# Patient Record
Sex: FEMALE | Race: WHITE | NOT HISPANIC OR LATINO | Employment: OTHER | URBAN - METROPOLITAN AREA
[De-identification: names, ages, dates, MRNs, and addresses within clinical notes are randomized per-mention and may not be internally consistent; named-entity substitution may affect disease eponyms.]

---

## 2023-05-15 ENCOUNTER — OFFICE VISIT (OUTPATIENT)
Dept: PRIMARY CARE | Facility: CLINIC | Age: 88
End: 2023-05-15
Payer: MEDICARE

## 2023-05-15 VITALS
DIASTOLIC BLOOD PRESSURE: 86 MMHG | OXYGEN SATURATION: 100 % | HEART RATE: 90 BPM | BODY MASS INDEX: 25.57 KG/M2 | WEIGHT: 158.4 LBS | SYSTOLIC BLOOD PRESSURE: 136 MMHG

## 2023-05-15 DIAGNOSIS — Z13.0 SCREENING FOR DEFICIENCY ANEMIA: ICD-10-CM

## 2023-05-15 DIAGNOSIS — R29.898 LEG WEAKNESS, BILATERAL: ICD-10-CM

## 2023-05-15 DIAGNOSIS — Z13.29 SCREENING FOR THYROID DISORDER: ICD-10-CM

## 2023-05-15 DIAGNOSIS — E55.9 VITAMIN D DEFICIENCY: ICD-10-CM

## 2023-05-15 DIAGNOSIS — I10 PRIMARY HYPERTENSION: ICD-10-CM

## 2023-05-15 DIAGNOSIS — Z00.00 ROUTINE GENERAL MEDICAL EXAMINATION AT A HEALTH CARE FACILITY: Primary | ICD-10-CM

## 2023-05-15 DIAGNOSIS — E06.3 HYPOTHYROIDISM DUE TO HASHIMOTO'S THYROIDITIS: ICD-10-CM

## 2023-05-15 DIAGNOSIS — E03.8 HYPOTHYROIDISM DUE TO HASHIMOTO'S THYROIDITIS: ICD-10-CM

## 2023-05-15 DIAGNOSIS — Z13.6 ENCOUNTER FOR SCREENING FOR CARDIOVASCULAR DISORDERS: ICD-10-CM

## 2023-05-15 PROBLEM — M79.651 PAIN OF RIGHT THIGH: Status: ACTIVE | Noted: 2023-05-15

## 2023-05-15 PROBLEM — M54.50 LOW BACK PAIN: Status: ACTIVE | Noted: 2023-05-15

## 2023-05-15 PROBLEM — F43.0 ACUTE STRESS DISORDER: Status: ACTIVE | Noted: 2023-05-15

## 2023-05-15 PROBLEM — M19.90 OSTEOARTHRITIS: Status: ACTIVE | Noted: 2023-05-15

## 2023-05-15 PROBLEM — Z85.828 HISTORY OF MOHS MICROGRAPHIC SURGERY FOR SKIN CANCER: Status: ACTIVE | Noted: 2023-05-15

## 2023-05-15 PROBLEM — L71.9 ROSACEA: Status: ACTIVE | Noted: 2023-05-15

## 2023-05-15 PROBLEM — M19.072 OSTEOARTHRITIS OF LEFT ANKLE: Status: ACTIVE | Noted: 2023-05-15

## 2023-05-15 PROBLEM — M19.049 OA (OSTEOARTHRITIS) OF FINGER: Status: ACTIVE | Noted: 2023-05-15

## 2023-05-15 PROBLEM — R00.2 PALPITATIONS: Status: ACTIVE | Noted: 2023-05-15

## 2023-05-15 PROBLEM — R26.81 UNSTEADINESS: Status: ACTIVE | Noted: 2023-05-15

## 2023-05-15 PROBLEM — E78.5 HYPERLIPIDEMIA: Status: ACTIVE | Noted: 2023-05-15

## 2023-05-15 PROBLEM — R60.0 EDEMA OF BOTH LEGS: Status: ACTIVE | Noted: 2023-05-15

## 2023-05-15 PROBLEM — G56.03 BILATERAL CARPAL TUNNEL SYNDROME: Status: ACTIVE | Noted: 2023-05-15

## 2023-05-15 PROBLEM — R06.09 DYSPNEA ON EXERTION: Status: ACTIVE | Noted: 2023-05-15

## 2023-05-15 PROBLEM — Z98.890 HISTORY OF MOHS MICROGRAPHIC SURGERY FOR SKIN CANCER: Status: ACTIVE | Noted: 2023-05-15

## 2023-05-15 PROBLEM — E03.9 HYPOTHYROIDISM: Status: ACTIVE | Noted: 2023-05-15

## 2023-05-15 PROBLEM — R94.31 EKG, ABNORMAL: Status: ACTIVE | Noted: 2023-05-15

## 2023-05-15 PROBLEM — M81.0 OSTEOPOROSIS: Status: ACTIVE | Noted: 2023-05-15

## 2023-05-15 LAB
ALANINE AMINOTRANSFERASE (SGPT) (U/L) IN SER/PLAS: 12 U/L (ref 7–45)
ALBUMIN (G/DL) IN SER/PLAS: 4.1 G/DL (ref 3.4–5)
ALKALINE PHOSPHATASE (U/L) IN SER/PLAS: 76 U/L (ref 33–136)
ANION GAP IN SER/PLAS: 15 MMOL/L (ref 10–20)
ASPARTATE AMINOTRANSFERASE (SGOT) (U/L) IN SER/PLAS: 17 U/L (ref 9–39)
BILIRUBIN TOTAL (MG/DL) IN SER/PLAS: 0.7 MG/DL (ref 0–1.2)
CALCIDIOL (25 OH VITAMIN D3) (NG/ML) IN SER/PLAS: 58 NG/ML
CALCIUM (MG/DL) IN SER/PLAS: 9.1 MG/DL (ref 8.6–10.6)
CARBON DIOXIDE, TOTAL (MMOL/L) IN SER/PLAS: 28 MMOL/L (ref 21–32)
CHLORIDE (MMOL/L) IN SER/PLAS: 102 MMOL/L (ref 98–107)
CHOLESTEROL (MG/DL) IN SER/PLAS: 176 MG/DL (ref 0–199)
CHOLESTEROL IN HDL (MG/DL) IN SER/PLAS: 56.2 MG/DL
CHOLESTEROL/HDL RATIO: 3.1
COBALAMIN (VITAMIN B12) (PG/ML) IN SER/PLAS: >2000 PG/ML (ref 211–911)
CREATININE (MG/DL) IN SER/PLAS: 1.3 MG/DL (ref 0.5–1.05)
ERYTHROCYTE DISTRIBUTION WIDTH (RATIO) BY AUTOMATED COUNT: 13.8 % (ref 11.5–14.5)
ERYTHROCYTE MEAN CORPUSCULAR HEMOGLOBIN CONCENTRATION (G/DL) BY AUTOMATED: 31.5 G/DL (ref 32–36)
ERYTHROCYTE MEAN CORPUSCULAR VOLUME (FL) BY AUTOMATED COUNT: 92 FL (ref 80–100)
ERYTHROCYTES (10*6/UL) IN BLOOD BY AUTOMATED COUNT: 4.22 X10E12/L (ref 4–5.2)
GFR FEMALE: 39 ML/MIN/1.73M2
GLUCOSE (MG/DL) IN SER/PLAS: 96 MG/DL (ref 74–99)
HEMATOCRIT (%) IN BLOOD BY AUTOMATED COUNT: 39 % (ref 36–46)
HEMOGLOBIN (G/DL) IN BLOOD: 12.3 G/DL (ref 12–16)
LDL: 96 MG/DL (ref 0–99)
LEUKOCYTES (10*3/UL) IN BLOOD BY AUTOMATED COUNT: 5.5 X10E9/L (ref 4.4–11.3)
NRBC (PER 100 WBCS) BY AUTOMATED COUNT: 0 /100 WBC (ref 0–0)
PLATELETS (10*3/UL) IN BLOOD AUTOMATED COUNT: 290 X10E9/L (ref 150–450)
POTASSIUM (MMOL/L) IN SER/PLAS: 4.1 MMOL/L (ref 3.5–5.3)
PROTEIN TOTAL: 6.9 G/DL (ref 6.4–8.2)
SODIUM (MMOL/L) IN SER/PLAS: 141 MMOL/L (ref 136–145)
THYROTROPIN (MIU/L) IN SER/PLAS BY DETECTION LIMIT <= 0.05 MIU/L: 1.73 MIU/L (ref 0.44–3.98)
TRIGLYCERIDE (MG/DL) IN SER/PLAS: 119 MG/DL (ref 0–149)
UREA NITROGEN (MG/DL) IN SER/PLAS: 30 MG/DL (ref 6–23)
VLDL: 24 MG/DL (ref 0–40)

## 2023-05-15 PROCEDURE — 3075F SYST BP GE 130 - 139MM HG: CPT | Performed by: STUDENT IN AN ORGANIZED HEALTH CARE EDUCATION/TRAINING PROGRAM

## 2023-05-15 PROCEDURE — G0439 PPPS, SUBSEQ VISIT: HCPCS | Performed by: STUDENT IN AN ORGANIZED HEALTH CARE EDUCATION/TRAINING PROGRAM

## 2023-05-15 PROCEDURE — 85027 COMPLETE CBC AUTOMATED: CPT

## 2023-05-15 PROCEDURE — 80053 COMPREHEN METABOLIC PANEL: CPT

## 2023-05-15 PROCEDURE — 1036F TOBACCO NON-USER: CPT | Performed by: STUDENT IN AN ORGANIZED HEALTH CARE EDUCATION/TRAINING PROGRAM

## 2023-05-15 PROCEDURE — 1160F RVW MEDS BY RX/DR IN RCRD: CPT | Performed by: STUDENT IN AN ORGANIZED HEALTH CARE EDUCATION/TRAINING PROGRAM

## 2023-05-15 PROCEDURE — 1170F FXNL STATUS ASSESSED: CPT | Performed by: STUDENT IN AN ORGANIZED HEALTH CARE EDUCATION/TRAINING PROGRAM

## 2023-05-15 PROCEDURE — 1159F MED LIST DOCD IN RCRD: CPT | Performed by: STUDENT IN AN ORGANIZED HEALTH CARE EDUCATION/TRAINING PROGRAM

## 2023-05-15 PROCEDURE — 84443 ASSAY THYROID STIM HORMONE: CPT

## 2023-05-15 PROCEDURE — 82306 VITAMIN D 25 HYDROXY: CPT

## 2023-05-15 PROCEDURE — 80061 LIPID PANEL: CPT

## 2023-05-15 PROCEDURE — 3079F DIAST BP 80-89 MM HG: CPT | Performed by: STUDENT IN AN ORGANIZED HEALTH CARE EDUCATION/TRAINING PROGRAM

## 2023-05-15 PROCEDURE — 82607 VITAMIN B-12: CPT

## 2023-05-15 RX ORDER — ASPIRIN 81 MG/1
1 TABLET ORAL DAILY
COMMUNITY

## 2023-05-15 RX ORDER — LEVOTHYROXINE SODIUM 50 UG/1
1 TABLET ORAL DAILY
COMMUNITY
Start: 2020-11-10 | End: 2023-12-26

## 2023-05-15 RX ORDER — ACETAMINOPHEN, DIPHENHYDRAMINE HCL, PHENYLEPHRINE HCL 325; 25; 5 MG/1; MG/1; MG/1
TABLET ORAL
COMMUNITY
Start: 2021-11-09 | End: 2023-11-15 | Stop reason: ALTCHOICE

## 2023-05-15 RX ORDER — TORSEMIDE 10 MG/1
TABLET ORAL
COMMUNITY
End: 2023-10-27

## 2023-05-15 RX ORDER — CHLORHEXIDINE GLUCONATE ORAL RINSE 1.2 MG/ML
SOLUTION DENTAL
COMMUNITY
Start: 2022-09-30 | End: 2023-11-15 | Stop reason: ALTCHOICE

## 2023-05-15 RX ORDER — PRAVASTATIN SODIUM 40 MG/1
40 TABLET ORAL DAILY
COMMUNITY
End: 2023-10-27

## 2023-05-15 RX ORDER — MUPIROCIN 20 MG/G
OINTMENT TOPICAL
COMMUNITY
Start: 2022-11-30 | End: 2023-11-15 | Stop reason: ALTCHOICE

## 2023-05-15 RX ORDER — AMLODIPINE BESYLATE 5 MG/1
5 TABLET ORAL DAILY
COMMUNITY
End: 2023-10-27

## 2023-05-15 RX ORDER — AMOXICILLIN 500 MG/1
CAPSULE ORAL
COMMUNITY
Start: 2023-03-14

## 2023-05-15 RX ORDER — IVERMECTIN 10 MG/G
CREAM TOPICAL
COMMUNITY
Start: 2018-11-01

## 2023-05-15 RX ORDER — PENICILLIN V POTASSIUM 500 MG/1
TABLET, FILM COATED ORAL
COMMUNITY
Start: 2022-09-30 | End: 2023-11-15 | Stop reason: ALTCHOICE

## 2023-05-15 RX ORDER — CHOLECALCIFEROL (VITAMIN D3) 125 MCG
TABLET ORAL
COMMUNITY
Start: 2019-12-06

## 2023-05-15 RX ORDER — POTASSIUM CHLORIDE 750 MG/1
10 TABLET, EXTENDED RELEASE ORAL DAILY
COMMUNITY
End: 2023-10-27

## 2023-05-15 ASSESSMENT — ENCOUNTER SYMPTOMS
OCCASIONAL FEELINGS OF UNSTEADINESS: 0
LOSS OF SENSATION IN FEET: 0
DEPRESSION: 0

## 2023-05-15 ASSESSMENT — ACTIVITIES OF DAILY LIVING (ADL)
BATHING: INDEPENDENT
TAKING_MEDICATION: INDEPENDENT
DOING_HOUSEWORK: INDEPENDENT
MANAGING_FINANCES: INDEPENDENT
DRESSING: INDEPENDENT
GROCERY_SHOPPING: INDEPENDENT

## 2023-05-15 ASSESSMENT — PATIENT HEALTH QUESTIONNAIRE - PHQ9
1. LITTLE INTEREST OR PLEASURE IN DOING THINGS: NOT AT ALL
2. FEELING DOWN, DEPRESSED OR HOPELESS: NOT AT ALL
SUM OF ALL RESPONSES TO PHQ9 QUESTIONS 1 AND 2: 0

## 2023-05-15 NOTE — PATIENT INSTRUCTIONS
1.  Medicare wellness.  No concerns on exam.  Screening labs ordered today.  Up-to-date with vaccinations.    2.  Bilateral leg weakness, she has not had falls but she does need a cane.  Advised on physical therapy for strengthening program for her lower extremities.  Also disability placard written today.    3.  History of hypothyroidism.  Hypertension.  Repeat labs today continue current med.    Call for any refills needed.    Pedro Luis Berrios DO  for questions and f/u please call office   [place] [phone] [facility] [phone]  any forms needed please fax   [place] [phone] [facility] [phone]  for Physical therapy orders can call [phone]  for Radiology orders can call [phone]  for general referrals can call [phone]  for cardiac testing can call [phone]

## 2023-05-15 NOTE — PROGRESS NOTES
Subjective   Patient ID: Jami Pandey is a 89 y.o. female who presents for Medicare Annual Wellness Visit Subsequent (She is fasting.).    HPI comes in for Medicare wellness.    Review of Systems  Constitutional: NO F, chills, or sweats  Eyes: no blurred vision or visual disturbance  ENT: no hearing loss, no congestion, no nasal discharge, no hoarseness and no sore throat.   Cardiovascular: no chest pain, no edema, no palps and no syncope.   Respiratory: no cough,no s.o.b. and no wheezing  Gastrointestinal: no abdominal pain, No C/D no N/V, no blood in stools  Genitourinary: no dysuria, no change in urinary frequency, no urinary hesitancy and no feelings of urinary urgency.   Musculoskeletal: no arthralgias,  no back pain and no myalgias.   Integumentary: no new skin lesions and no rashes.   Neurological: no difficulty walking, no headache, no limb weakness, no numbness and no tingling.   Psychiatric: no anxiety, no depression, no anhedonia and no substance use disorders.   Endocrine: no recent weight gain and no recent weight loss.   Hematologic/Lymphatic: no tendency for easy bruising and no swollen glands.  Objective   /86 (BP Location: Right arm, Patient Position: Sitting, BP Cuff Size: Adult)   Pulse 90   Wt 71.8 kg (158 lb 6.4 oz)   SpO2 100%   BMI 25.57 kg/m²     Physical Exam  gen- a & o x 3, nad, pleasant  heent- eomi, perrla, ear canals patent, TM's non-erythematous, no fluid, frontal and maxillary sinus's nontender  neck- supple, nontender, no palpable or enlarged nodes, no thyromegaly  heart- rrr, no murmurs  lungs- cta b/l , no w/r/r  chest- symmetric, nontender  ab- soft, nontender, no palpable organomegaly, postive bowel sounds  ex's- no c/c/e  neuro- CNs 2-12 grossly intact, full sensation and strength in all extremities    Assessment/Plan     1.  Medicare wellness.  No concerns on exam.  Screening labs ordered today.  Up-to-date with vaccinations.    2.  Bilateral leg weakness, she has  not had falls but she does need a cane.  Advised on physical therapy for strengthening program for her lower extremities.  Also disability placard written today.    3.  History of hypothyroidism.  Hypertension.  Repeat labs today continue current med.    Call for any refills needed.

## 2023-10-27 DIAGNOSIS — E78.5 HYPERLIPIDEMIA, UNSPECIFIED HYPERLIPIDEMIA TYPE: ICD-10-CM

## 2023-10-27 DIAGNOSIS — I10 PRIMARY HYPERTENSION: Primary | ICD-10-CM

## 2023-10-27 PROBLEM — Z98.890 HISTORY OF MOHS MICROGRAPHIC SURGERY FOR SKIN CANCER: Status: ACTIVE | Noted: 2019-12-07

## 2023-10-27 PROBLEM — Z85.828 HISTORY OF MOHS MICROGRAPHIC SURGERY FOR SKIN CANCER: Status: ACTIVE | Noted: 2019-12-07

## 2023-10-27 PROBLEM — S01.00XA OPEN WOUND OF SCALP, COMPLICATED: Status: ACTIVE | Noted: 2019-12-07

## 2023-10-27 RX ORDER — FUROSEMIDE 20 MG/1
1 TABLET ORAL DAILY
COMMUNITY
Start: 2017-06-14 | End: 2023-11-15 | Stop reason: ALTCHOICE

## 2023-10-27 RX ORDER — TORSEMIDE 10 MG/1
TABLET ORAL
Qty: 135 TABLET | Refills: 3 | Status: SHIPPED | OUTPATIENT
Start: 2023-10-27

## 2023-10-27 RX ORDER — AMLODIPINE BESYLATE 5 MG/1
5 TABLET ORAL DAILY
Qty: 90 TABLET | Refills: 3 | Status: SHIPPED | OUTPATIENT
Start: 2023-10-27

## 2023-10-27 RX ORDER — POTASSIUM CHLORIDE 750 MG/1
TABLET, FILM COATED, EXTENDED RELEASE ORAL DAILY
Qty: 90 TABLET | Refills: 3 | Status: SHIPPED | OUTPATIENT
Start: 2023-10-27

## 2023-10-27 RX ORDER — PRAVASTATIN SODIUM 40 MG/1
40 TABLET ORAL DAILY
Qty: 90 TABLET | Refills: 3 | Status: SHIPPED | OUTPATIENT
Start: 2023-10-27

## 2023-11-15 ENCOUNTER — OFFICE VISIT (OUTPATIENT)
Dept: PRIMARY CARE | Facility: CLINIC | Age: 88
End: 2023-11-15
Payer: MEDICARE

## 2023-11-15 VITALS
WEIGHT: 157.8 LBS | HEIGHT: 66 IN | OXYGEN SATURATION: 99 % | SYSTOLIC BLOOD PRESSURE: 128 MMHG | BODY MASS INDEX: 25.36 KG/M2 | DIASTOLIC BLOOD PRESSURE: 62 MMHG | HEART RATE: 85 BPM

## 2023-11-15 DIAGNOSIS — L91.8 SKIN TAG: Primary | ICD-10-CM

## 2023-11-15 PROCEDURE — 1126F AMNT PAIN NOTED NONE PRSNT: CPT | Performed by: STUDENT IN AN ORGANIZED HEALTH CARE EDUCATION/TRAINING PROGRAM

## 2023-11-15 PROCEDURE — 3078F DIAST BP <80 MM HG: CPT | Performed by: STUDENT IN AN ORGANIZED HEALTH CARE EDUCATION/TRAINING PROGRAM

## 2023-11-15 PROCEDURE — 1160F RVW MEDS BY RX/DR IN RCRD: CPT | Performed by: STUDENT IN AN ORGANIZED HEALTH CARE EDUCATION/TRAINING PROGRAM

## 2023-11-15 PROCEDURE — 1159F MED LIST DOCD IN RCRD: CPT | Performed by: STUDENT IN AN ORGANIZED HEALTH CARE EDUCATION/TRAINING PROGRAM

## 2023-11-15 PROCEDURE — 99213 OFFICE O/P EST LOW 20 MIN: CPT | Performed by: STUDENT IN AN ORGANIZED HEALTH CARE EDUCATION/TRAINING PROGRAM

## 2023-11-15 PROCEDURE — 3074F SYST BP LT 130 MM HG: CPT | Performed by: STUDENT IN AN ORGANIZED HEALTH CARE EDUCATION/TRAINING PROGRAM

## 2023-11-15 PROCEDURE — 1036F TOBACCO NON-USER: CPT | Performed by: STUDENT IN AN ORGANIZED HEALTH CARE EDUCATION/TRAINING PROGRAM

## 2023-11-15 RX ORDER — CEPHALEXIN 500 MG/1
500 CAPSULE ORAL 3 TIMES DAILY
Qty: 30 CAPSULE | Refills: 0 | Status: SHIPPED | OUTPATIENT
Start: 2023-11-15 | End: 2023-11-25

## 2023-11-15 ASSESSMENT — PAIN SCALES - GENERAL: PAINLEVEL: 0-NO PAIN

## 2023-11-15 ASSESSMENT — ENCOUNTER SYMPTOMS: DEPRESSION: 0

## 2023-11-15 NOTE — PROGRESS NOTES
"Subjective   Patient ID: Jami Pandey is a 89 y.o. female who presents for Follow-up (6 month follow up for diabetes.).    HPI comes in for 6-month checkup    Review of Systems  Constitutional: NO F, chills, or sweats  Eyes: no blurred vision or visual disturbance  ENT: no hearing loss, no congestion, no nasal discharge, no hoarseness and no sore throat.   Cardiovascular: no chest pain, no edema, no palps and no syncope.   Respiratory: no cough,no s.o.b. and no wheezing  Gastrointestinal: no abdominal pain, No C/D no N/V, no blood in stools  Genitourinary: no dysuria, no change in urinary frequency, no urinary hesitancy and no feelings of urinary urgency.   Musculoskeletal: no arthralgias,  no back pain and no myalgias.   Integumentary: Sore posterior left thigh after skin tag removal  Neurological: no difficulty walking, no headache, no limb weakness, no numbness and no tingling.   Psychiatric: no anxiety, no depression, no anhedonia and no substance use disorders.   Endocrine: no recent weight gain and no recent weight loss.   Hematologic/Lymphatic: no tendency for easy bruising and no swollen glands.  Objective   /62 (BP Location: Right arm, Patient Position: Sitting, BP Cuff Size: Adult)   Pulse 85   Ht 1.676 m (5' 6\")   Wt 71.6 kg (157 lb 12.8 oz)   SpO2 99%   BMI 25.47 kg/m²     Physical Exam  gen- a & o x 3, nad, pleasant  heent- eomi, perrla, ear canals patent, TM's non-erythematous, no fluid, frontal and maxillary sinus's nontender  neck- supple, nontender, no palpable or enlarged nodes, no thyromegaly  heart- rrr, no murmurs  lungs- cta b/l , no w/r/r  chest- symmetric, nontender  ab- soft, nontender, no palpable organomegaly, postive bowel sounds  ex's- no c/c/e  neuro- CNs 2-12 grossly intact, full sensation and strength in all extremities  skin-left posterior thigh open sore mild drainage  Assessment/Plan     1.  Comes in for 6-month checkup.  No concerns on exam.  No major changes.  She is " up-to-date with vaccinations up-to-date with health screenings.  Follow-up in 6 months for Medicare wellness.    2.  Recently had a skin tag removed left posterior thigh.  Some mild erythema concern for early infection.  Keflex as directed for 10 days

## 2023-11-15 NOTE — PATIENT INSTRUCTIONS
1.  Comes in for 6-month checkup.  No concerns on exam.  No major changes.  She is up-to-date with vaccinations up-to-date with health screenings.  Follow-up in 6 months for Medicare wellness.    2.  Recently had a skin tag removed left posterior thigh.  Some mild erythema concern for early infection.  Keflex as directed for 10 days

## 2023-12-26 DIAGNOSIS — E03.9 HYPOTHYROIDISM, UNSPECIFIED TYPE: Primary | ICD-10-CM

## 2023-12-26 RX ORDER — LEVOTHYROXINE SODIUM 50 UG/1
50 TABLET ORAL DAILY
Qty: 90 TABLET | Refills: 3 | Status: SHIPPED | OUTPATIENT
Start: 2023-12-26

## 2024-05-20 ENCOUNTER — OFFICE VISIT (OUTPATIENT)
Dept: PRIMARY CARE | Facility: CLINIC | Age: 89
End: 2024-05-20
Payer: MEDICARE

## 2024-05-20 VITALS
WEIGHT: 153.8 LBS | HEART RATE: 70 BPM | DIASTOLIC BLOOD PRESSURE: 72 MMHG | BODY MASS INDEX: 24.82 KG/M2 | SYSTOLIC BLOOD PRESSURE: 132 MMHG | OXYGEN SATURATION: 96 %

## 2024-05-20 DIAGNOSIS — E06.3 HYPOTHYROIDISM DUE TO HASHIMOTO'S THYROIDITIS: ICD-10-CM

## 2024-05-20 DIAGNOSIS — I10 PRIMARY HYPERTENSION: ICD-10-CM

## 2024-05-20 DIAGNOSIS — Z13.6 ENCOUNTER FOR SCREENING FOR CARDIOVASCULAR DISORDERS: ICD-10-CM

## 2024-05-20 DIAGNOSIS — E55.9 VITAMIN D DEFICIENCY: ICD-10-CM

## 2024-05-20 DIAGNOSIS — Z13.29 SCREENING FOR THYROID DISORDER: ICD-10-CM

## 2024-05-20 DIAGNOSIS — Z00.00 WELLNESS EXAMINATION: Primary | ICD-10-CM

## 2024-05-20 DIAGNOSIS — E03.8 HYPOTHYROIDISM DUE TO HASHIMOTO'S THYROIDITIS: ICD-10-CM

## 2024-05-20 DIAGNOSIS — Z13.0 SCREENING FOR DEFICIENCY ANEMIA: ICD-10-CM

## 2024-05-20 DIAGNOSIS — E78.5 HYPERLIPIDEMIA, UNSPECIFIED HYPERLIPIDEMIA TYPE: ICD-10-CM

## 2024-05-20 PROBLEM — G56.00 CARPAL TUNNEL SYNDROME: Status: ACTIVE | Noted: 2024-05-20

## 2024-05-20 PROBLEM — M79.659 PAIN OF THIGH: Status: ACTIVE | Noted: 2023-05-15

## 2024-05-20 PROBLEM — R60.0 EDEMA OF LOWER EXTREMITY: Status: ACTIVE | Noted: 2024-05-20

## 2024-05-20 PROBLEM — E66.3 OVERWEIGHT WITH BODY MASS INDEX (BMI) 25.0-29.9: Status: ACTIVE | Noted: 2024-05-20

## 2024-05-20 LAB
25(OH)D3 SERPL-MCNC: 68 NG/ML (ref 30–100)
ALBUMIN SERPL BCP-MCNC: 4.2 G/DL (ref 3.4–5)
ALP SERPL-CCNC: 74 U/L (ref 33–136)
ALT SERPL W P-5'-P-CCNC: 11 U/L (ref 7–45)
ANION GAP SERPL CALC-SCNC: 17 MMOL/L (ref 10–20)
AST SERPL W P-5'-P-CCNC: 15 U/L (ref 9–39)
BASOPHILS # BLD AUTO: 0.07 X10*3/UL (ref 0–0.1)
BASOPHILS NFR BLD AUTO: 1.1 %
BILIRUB SERPL-MCNC: 0.6 MG/DL (ref 0–1.2)
BUN SERPL-MCNC: 29 MG/DL (ref 6–23)
CALCIUM SERPL-MCNC: 9.2 MG/DL (ref 8.6–10.6)
CHLORIDE SERPL-SCNC: 101 MMOL/L (ref 98–107)
CHOLEST SERPL-MCNC: 155 MG/DL (ref 0–199)
CHOLESTEROL/HDL RATIO: 3.1
CO2 SERPL-SCNC: 29 MMOL/L (ref 21–32)
CREAT SERPL-MCNC: 1.39 MG/DL (ref 0.5–1.05)
EGFRCR SERPLBLD CKD-EPI 2021: 36 ML/MIN/1.73M*2
EOSINOPHIL # BLD AUTO: 0.29 X10*3/UL (ref 0–0.4)
EOSINOPHIL NFR BLD AUTO: 4.5 %
ERYTHROCYTE [DISTWIDTH] IN BLOOD BY AUTOMATED COUNT: 14 % (ref 11.5–14.5)
GLUCOSE SERPL-MCNC: 91 MG/DL (ref 74–99)
HCT VFR BLD AUTO: 36.5 % (ref 36–46)
HDLC SERPL-MCNC: 50 MG/DL
HGB BLD-MCNC: 11.5 G/DL (ref 12–16)
IMM GRANULOCYTES # BLD AUTO: 0.01 X10*3/UL (ref 0–0.5)
IMM GRANULOCYTES NFR BLD AUTO: 0.2 % (ref 0–0.9)
LDLC SERPL CALC-MCNC: 86 MG/DL
LYMPHOCYTES # BLD AUTO: 1.55 X10*3/UL (ref 0.8–3)
LYMPHOCYTES NFR BLD AUTO: 24.1 %
MCH RBC QN AUTO: 28.6 PG (ref 26–34)
MCHC RBC AUTO-ENTMCNC: 31.5 G/DL (ref 32–36)
MCV RBC AUTO: 91 FL (ref 80–100)
MONOCYTES # BLD AUTO: 0.55 X10*3/UL (ref 0.05–0.8)
MONOCYTES NFR BLD AUTO: 8.5 %
NEUTROPHILS # BLD AUTO: 3.97 X10*3/UL (ref 1.6–5.5)
NEUTROPHILS NFR BLD AUTO: 61.6 %
NON HDL CHOLESTEROL: 105 MG/DL (ref 0–149)
NRBC BLD-RTO: 0 /100 WBCS (ref 0–0)
PLATELET # BLD AUTO: 300 X10*3/UL (ref 150–450)
POTASSIUM SERPL-SCNC: 4.5 MMOL/L (ref 3.5–5.3)
PROT SERPL-MCNC: 6.7 G/DL (ref 6.4–8.2)
RBC # BLD AUTO: 4.02 X10*6/UL (ref 4–5.2)
SODIUM SERPL-SCNC: 142 MMOL/L (ref 136–145)
TRIGL SERPL-MCNC: 97 MG/DL (ref 0–149)
TSH SERPL-ACNC: 1.6 MIU/L (ref 0.44–3.98)
VIT B12 SERPL-MCNC: >2000 PG/ML (ref 211–911)
VLDL: 19 MG/DL (ref 0–40)
WBC # BLD AUTO: 6.4 X10*3/UL (ref 4.4–11.3)

## 2024-05-20 PROCEDURE — 82607 VITAMIN B-12: CPT

## 2024-05-20 PROCEDURE — 1159F MED LIST DOCD IN RCRD: CPT | Performed by: STUDENT IN AN ORGANIZED HEALTH CARE EDUCATION/TRAINING PROGRAM

## 2024-05-20 PROCEDURE — 82306 VITAMIN D 25 HYDROXY: CPT

## 2024-05-20 PROCEDURE — 80053 COMPREHEN METABOLIC PANEL: CPT

## 2024-05-20 PROCEDURE — 3075F SYST BP GE 130 - 139MM HG: CPT | Performed by: STUDENT IN AN ORGANIZED HEALTH CARE EDUCATION/TRAINING PROGRAM

## 2024-05-20 PROCEDURE — 36415 COLL VENOUS BLD VENIPUNCTURE: CPT

## 2024-05-20 PROCEDURE — 1036F TOBACCO NON-USER: CPT | Performed by: STUDENT IN AN ORGANIZED HEALTH CARE EDUCATION/TRAINING PROGRAM

## 2024-05-20 PROCEDURE — 1170F FXNL STATUS ASSESSED: CPT | Performed by: STUDENT IN AN ORGANIZED HEALTH CARE EDUCATION/TRAINING PROGRAM

## 2024-05-20 PROCEDURE — 99214 OFFICE O/P EST MOD 30 MIN: CPT | Performed by: STUDENT IN AN ORGANIZED HEALTH CARE EDUCATION/TRAINING PROGRAM

## 2024-05-20 PROCEDURE — 84443 ASSAY THYROID STIM HORMONE: CPT

## 2024-05-20 PROCEDURE — 1160F RVW MEDS BY RX/DR IN RCRD: CPT | Performed by: STUDENT IN AN ORGANIZED HEALTH CARE EDUCATION/TRAINING PROGRAM

## 2024-05-20 PROCEDURE — G0439 PPPS, SUBSEQ VISIT: HCPCS | Performed by: STUDENT IN AN ORGANIZED HEALTH CARE EDUCATION/TRAINING PROGRAM

## 2024-05-20 PROCEDURE — 1125F AMNT PAIN NOTED PAIN PRSNT: CPT | Performed by: STUDENT IN AN ORGANIZED HEALTH CARE EDUCATION/TRAINING PROGRAM

## 2024-05-20 PROCEDURE — 80061 LIPID PANEL: CPT

## 2024-05-20 PROCEDURE — 3078F DIAST BP <80 MM HG: CPT | Performed by: STUDENT IN AN ORGANIZED HEALTH CARE EDUCATION/TRAINING PROGRAM

## 2024-05-20 PROCEDURE — 85025 COMPLETE CBC W/AUTO DIFF WBC: CPT

## 2024-05-20 RX ORDER — CHOLECALCIFEROL (VITAMIN D3) 25 MCG
1 TABLET,CHEWABLE ORAL DAILY
COMMUNITY

## 2024-05-20 ASSESSMENT — ENCOUNTER SYMPTOMS
OCCASIONAL FEELINGS OF UNSTEADINESS: 0
LOSS OF SENSATION IN FEET: 0
DEPRESSION: 0

## 2024-05-20 ASSESSMENT — ACTIVITIES OF DAILY LIVING (ADL)
DRESSING: INDEPENDENT
BATHING: INDEPENDENT
MANAGING_FINANCES: INDEPENDENT
DOING_HOUSEWORK: INDEPENDENT
GROCERY_SHOPPING: INDEPENDENT
TAKING_MEDICATION: INDEPENDENT

## 2024-05-20 ASSESSMENT — PATIENT HEALTH QUESTIONNAIRE - PHQ9
1. LITTLE INTEREST OR PLEASURE IN DOING THINGS: NOT AT ALL
SUM OF ALL RESPONSES TO PHQ9 QUESTIONS 1 AND 2: 0
2. FEELING DOWN, DEPRESSED OR HOPELESS: NOT AT ALL

## 2024-05-20 ASSESSMENT — PAIN SCALES - GENERAL: PAINLEVEL: 1

## 2024-05-20 NOTE — PATIENT INSTRUCTIONS
1.  Comes in for Medicare wellness  No concerns on exam.  No major changes.  She is up-to-date with vaccinations up-to-date with health screenings.    She had a good bone density scan in 2022.  Consider repeat next year.  Defers on future mammograms.  No longer needs colonoscopies due to age.    2.  Continue all current meds please call if refills needed.

## 2024-05-20 NOTE — PROGRESS NOTES
Subjective   Patient ID: Jami Pandey is a 89 y.o. female who presents for Medicare Annual Wellness Visit Subsequent (She is fasting.).    HPI comes in for Medicare wellness    Review of Systems  Constitutional: NO F, chills, or sweats  Eyes: no blurred vision or visual disturbance  ENT: no hearing loss, no congestion, no nasal discharge, no hoarseness and no sore throat.   Cardiovascular: no chest pain, no edema, no palps and no syncope.   Respiratory: no cough,no s.o.b. and no wheezing  Gastrointestinal: no abdominal pain, No C/D no N/V, no blood in stools  Genitourinary: no dysuria, no change in urinary frequency, no urinary hesitancy and no feelings of urinary urgency.   Musculoskeletal: no arthralgias,  no back pain and no myalgias.   Integumentary: no new skin lesions and no rashes.   Neurological: no difficulty walking, no headache, no limb weakness, no numbness and no tingling.   Psychiatric: no anxiety, no depression, no anhedonia and no substance use disorders.   Endocrine: no recent weight gain and no recent weight loss.   Hematologic/Lymphatic: no tendency for easy bruising and no swollen glands.  Objective   /72 (BP Location: Right arm, Patient Position: Sitting, BP Cuff Size: Adult)   Pulse 70   Wt 69.8 kg (153 lb 12.8 oz)   SpO2 96%   BMI 24.82 kg/m²     Physical Exam  gen- a & o x 3, nad, pleasant  heent- eomi, perrla, ear canals patent, TM's non-erythematous, no fluid, frontal and maxillary sinus's nontender  neck- supple, nontender, no palpable or enlarged nodes, no thyromegaly  heart- rrr, no murmurs  lungs- cta b/l , no w/r/r  chest- symmetric, nontender  ab- soft, nontender, no palpable organomegaly, postive bowel sounds  ex's- no c/c/e  neuro- CNs 2-12 grossly intact, full sensation and strength in all extremities    Assessment/Plan     1.  Comes in for Medicare wellness  No concerns on exam.  No major changes.  She is up-to-date with vaccinations up-to-date with health screenings.     She had a good bone density scan in 2022.  Consider repeat next year.  Defers on future mammograms.  No longer needs colonoscopies due to age.    2.  Continue all current meds please call if refills needed.

## 2024-07-23 ENCOUNTER — APPOINTMENT (OUTPATIENT)
Dept: PRIMARY CARE | Facility: CLINIC | Age: 89
End: 2024-07-23
Payer: MEDICARE

## 2024-07-23 VITALS
WEIGHT: 153 LBS | HEIGHT: 65 IN | DIASTOLIC BLOOD PRESSURE: 70 MMHG | SYSTOLIC BLOOD PRESSURE: 124 MMHG | OXYGEN SATURATION: 95 % | RESPIRATION RATE: 16 BRPM | HEART RATE: 86 BPM | BODY MASS INDEX: 25.49 KG/M2

## 2024-07-23 DIAGNOSIS — E78.5 HYPERLIPIDEMIA, UNSPECIFIED HYPERLIPIDEMIA TYPE: ICD-10-CM

## 2024-07-23 DIAGNOSIS — M19.042 PRIMARY OSTEOARTHRITIS OF BOTH HANDS: ICD-10-CM

## 2024-07-23 DIAGNOSIS — M35.1 MCTD (MIXED CONNECTIVE TISSUE DISEASE) (MULTI): ICD-10-CM

## 2024-07-23 DIAGNOSIS — M19.041 PRIMARY OSTEOARTHRITIS OF BOTH HANDS: ICD-10-CM

## 2024-07-23 DIAGNOSIS — E03.8 HYPOTHYROIDISM DUE TO HASHIMOTO'S THYROIDITIS: ICD-10-CM

## 2024-07-23 DIAGNOSIS — I10 PRIMARY HYPERTENSION: Primary | ICD-10-CM

## 2024-07-23 DIAGNOSIS — E06.3 HYPOTHYROIDISM DUE TO HASHIMOTO'S THYROIDITIS: ICD-10-CM

## 2024-07-23 PROCEDURE — 1125F AMNT PAIN NOTED PAIN PRSNT: CPT | Performed by: INTERNAL MEDICINE

## 2024-07-23 PROCEDURE — 3078F DIAST BP <80 MM HG: CPT | Performed by: INTERNAL MEDICINE

## 2024-07-23 PROCEDURE — 3074F SYST BP LT 130 MM HG: CPT | Performed by: INTERNAL MEDICINE

## 2024-07-23 PROCEDURE — 1159F MED LIST DOCD IN RCRD: CPT | Performed by: INTERNAL MEDICINE

## 2024-07-23 PROCEDURE — 99204 OFFICE O/P NEW MOD 45 MIN: CPT | Performed by: INTERNAL MEDICINE

## 2024-07-23 RX ORDER — PRAVASTATIN SODIUM 20 MG/1
20 TABLET ORAL DAILY
Qty: 90 TABLET | Refills: 3 | Status: SHIPPED | OUTPATIENT
Start: 2024-07-23

## 2024-07-23 ASSESSMENT — ENCOUNTER SYMPTOMS
LOSS OF SENSATION IN FEET: 0
OCCASIONAL FEELINGS OF UNSTEADINESS: 1
DEPRESSION: 0

## 2024-07-23 ASSESSMENT — PAIN SCALES - GENERAL: PAINLEVEL: 2

## 2024-07-24 ENCOUNTER — LAB (OUTPATIENT)
Dept: LAB | Facility: LAB | Age: 89
End: 2024-07-24
Payer: MEDICARE

## 2024-07-24 DIAGNOSIS — I10 PRIMARY HYPERTENSION: ICD-10-CM

## 2024-07-24 DIAGNOSIS — M19.041 PRIMARY OSTEOARTHRITIS OF BOTH HANDS: ICD-10-CM

## 2024-07-24 DIAGNOSIS — M19.042 PRIMARY OSTEOARTHRITIS OF BOTH HANDS: ICD-10-CM

## 2024-07-24 LAB
ANION GAP SERPL CALC-SCNC: 16 MMOL/L (ref 10–20)
BUN SERPL-MCNC: 32 MG/DL (ref 6–23)
CALCIUM SERPL-MCNC: 9.2 MG/DL (ref 8.6–10.3)
CHLORIDE SERPL-SCNC: 101 MMOL/L (ref 98–107)
CO2 SERPL-SCNC: 29 MMOL/L (ref 21–32)
CREAT SERPL-MCNC: 1.59 MG/DL (ref 0.5–1.05)
EGFRCR SERPLBLD CKD-EPI 2021: 31 ML/MIN/1.73M*2
ERYTHROCYTE [SEDIMENTATION RATE] IN BLOOD BY WESTERGREN METHOD: 42 MM/H (ref 0–30)
GLUCOSE SERPL-MCNC: 163 MG/DL (ref 74–99)
POTASSIUM SERPL-SCNC: 4.8 MMOL/L (ref 3.5–5.3)
RHEUMATOID FACT SER NEPH-ACNC: 14 IU/ML (ref 0–15)
SODIUM SERPL-SCNC: 141 MMOL/L (ref 136–145)
URATE SERPL-MCNC: 7.9 MG/DL (ref 2.3–6.7)

## 2024-07-24 PROCEDURE — 86225 DNA ANTIBODY NATIVE: CPT

## 2024-07-24 PROCEDURE — 85652 RBC SED RATE AUTOMATED: CPT

## 2024-07-24 PROCEDURE — 36415 COLL VENOUS BLD VENIPUNCTURE: CPT

## 2024-07-24 PROCEDURE — 86235 NUCLEAR ANTIGEN ANTIBODY: CPT

## 2024-07-24 PROCEDURE — 84550 ASSAY OF BLOOD/URIC ACID: CPT

## 2024-07-24 PROCEDURE — 86038 ANTINUCLEAR ANTIBODIES: CPT

## 2024-07-24 PROCEDURE — 86431 RHEUMATOID FACTOR QUANT: CPT

## 2024-07-24 PROCEDURE — 80048 BASIC METABOLIC PNL TOTAL CA: CPT

## 2024-07-26 LAB
ANA PATTERN: ABNORMAL
ANA SER QL HEP2 SUBST: POSITIVE
ANA TITR SER IF: ABNORMAL {TITER}
CENTROMERE B AB SER-ACNC: <0.2 AI
CHROMATIN AB SERPL-ACNC: <0.2 AI
DSDNA AB SER-ACNC: 1 IU/ML
ENA JO1 AB SER QL IA: <0.2 AI
ENA RNP AB SER IA-ACNC: 1.1 AI
ENA SCL70 AB SER QL IA: <0.2 AI
ENA SM AB SER IA-ACNC: <0.2 AI
ENA SM+RNP AB SER QL IA: <0.2 AI
ENA SS-A AB SER IA-ACNC: >8 AI
ENA SS-B AB SER IA-ACNC: 2.3 AI
RIBOSOMAL P AB SER-ACNC: <0.2 AI

## 2024-08-01 NOTE — PROGRESS NOTES
"Subjective   Jami Pandey is a 89 y.o. female who presents for New Patient Visit.  Patient presents to [facility].  She was previously seen Dr. Berrios.  She has a history of hypertension.  She has a history of hyperlipidemia.  She complains of chronic joint pain.  It is currently rated 2 out of 10, but can get much worse.  She does not have a history of gout.  She has never had an arthritis panel checked.        Objective     /70 (BP Location: Right arm, Patient Position: Sitting, BP Cuff Size: Adult)   Pulse 86   Resp 16   Ht 1.651 m (5' 5\")   Wt 69.4 kg (153 lb)   LMP  (LMP Unknown)   SpO2 95%   BMI 25.46 kg/m²      Physical Exam  Constitutional:       Appearance: Normal appearance.   HENT:      Head: Normocephalic and atraumatic.      Nose: Nose normal.      Mouth/Throat:      Mouth: Mucous membranes are moist.      Pharynx: Oropharynx is clear.   Eyes:      Extraocular Movements: Extraocular movements intact.      Pupils: Pupils are equal, round, and reactive to light.   Cardiovascular:      Rate and Rhythm: Normal rate and regular rhythm.   Pulmonary:      Effort: No respiratory distress.      Breath sounds: Normal breath sounds. No wheezing, rhonchi or rales.   Abdominal:      General: Bowel sounds are normal. There is no distension.      Palpations: Abdomen is soft.      Tenderness: There is no abdominal tenderness. There is no guarding.   Musculoskeletal:      Right lower leg: No edema.      Left lower leg: No edema.   Skin:     General: Skin is warm and dry.   Neurological:      Mental Status: She is alert and oriented to person, place, and time. Mental status is at baseline.   Psychiatric:         Mood and Affect: Mood normal.         Behavior: Behavior normal.         Assessment/Plan   Problem List Items Addressed This Visit       Hyperlipidemia    Relevant Medications    pravastatin (Pravachol) 20 mg tablet    Hypertension - Primary    Relevant Orders    Basic Metabolic Panel " (Completed)    Uric acid    Hypothyroidism     Continue current medications         Osteoarthritis    Relevant Orders    Arthritis Panel (CMS) (Completed)     Check arthritis panel  Follow-up in 3 months       Garcia Alves DO

## 2024-09-17 ENCOUNTER — APPOINTMENT (OUTPATIENT)
Dept: RHEUMATOLOGY | Facility: CLINIC | Age: 89
End: 2024-09-17
Payer: MEDICARE

## 2024-10-06 DIAGNOSIS — I10 PRIMARY HYPERTENSION: ICD-10-CM

## 2024-10-07 RX ORDER — POTASSIUM CHLORIDE 750 MG/1
10 TABLET, EXTENDED RELEASE ORAL DAILY
Qty: 90 TABLET | Refills: 0 | Status: SHIPPED | OUTPATIENT
Start: 2024-10-07

## 2024-10-07 RX ORDER — AMLODIPINE BESYLATE 5 MG/1
5 TABLET ORAL DAILY
Qty: 90 TABLET | Refills: 0 | Status: SHIPPED | OUTPATIENT
Start: 2024-10-07

## 2024-10-24 ENCOUNTER — APPOINTMENT (OUTPATIENT)
Dept: PRIMARY CARE | Facility: CLINIC | Age: 89
End: 2024-10-24
Payer: MEDICARE

## 2024-10-24 VITALS
SYSTOLIC BLOOD PRESSURE: 132 MMHG | BODY MASS INDEX: 25.33 KG/M2 | HEIGHT: 65 IN | DIASTOLIC BLOOD PRESSURE: 80 MMHG | WEIGHT: 152 LBS | RESPIRATION RATE: 16 BRPM | HEART RATE: 84 BPM | OXYGEN SATURATION: 98 %

## 2024-10-24 DIAGNOSIS — Z00.00 ENCOUNTER FOR ANNUAL WELLNESS VISIT (AWV) IN MEDICARE PATIENT: ICD-10-CM

## 2024-10-24 DIAGNOSIS — E06.3 HYPOTHYROIDISM DUE TO HASHIMOTO THYROIDITIS: ICD-10-CM

## 2024-10-24 DIAGNOSIS — I10 PRIMARY HYPERTENSION: ICD-10-CM

## 2024-10-24 DIAGNOSIS — E55.9 VITAMIN D DEFICIENCY, UNSPECIFIED: ICD-10-CM

## 2024-10-24 DIAGNOSIS — R79.9 ABNORMAL FINDING OF BLOOD CHEMISTRY, UNSPECIFIED: ICD-10-CM

## 2024-10-24 DIAGNOSIS — E78.5 HYPERLIPIDEMIA, UNSPECIFIED HYPERLIPIDEMIA TYPE: ICD-10-CM

## 2024-10-24 DIAGNOSIS — G56.03 BILATERAL CARPAL TUNNEL SYNDROME: Primary | ICD-10-CM

## 2024-10-24 PROCEDURE — 1159F MED LIST DOCD IN RCRD: CPT | Performed by: INTERNAL MEDICINE

## 2024-10-24 PROCEDURE — 3079F DIAST BP 80-89 MM HG: CPT | Performed by: INTERNAL MEDICINE

## 2024-10-24 PROCEDURE — 99214 OFFICE O/P EST MOD 30 MIN: CPT | Performed by: INTERNAL MEDICINE

## 2024-10-24 PROCEDURE — 3075F SYST BP GE 130 - 139MM HG: CPT | Performed by: INTERNAL MEDICINE

## 2024-10-24 PROCEDURE — G2211 COMPLEX E/M VISIT ADD ON: HCPCS | Performed by: INTERNAL MEDICINE

## 2024-10-24 PROCEDURE — 1126F AMNT PAIN NOTED NONE PRSNT: CPT | Performed by: INTERNAL MEDICINE

## 2024-10-24 ASSESSMENT — PAIN SCALES - GENERAL: PAINLEVEL_OUTOF10: 0-NO PAIN

## 2024-12-11 DIAGNOSIS — E03.9 HYPOTHYROIDISM, UNSPECIFIED TYPE: ICD-10-CM

## 2024-12-11 DIAGNOSIS — I10 PRIMARY HYPERTENSION: ICD-10-CM

## 2024-12-11 RX ORDER — LEVOTHYROXINE SODIUM 50 UG/1
50 TABLET ORAL DAILY
Qty: 90 TABLET | Refills: 3 | Status: SHIPPED | OUTPATIENT
Start: 2024-12-11

## 2024-12-11 RX ORDER — TORSEMIDE 10 MG/1
10 TABLET ORAL DAILY
Qty: 90 TABLET | Refills: 3 | Status: SHIPPED | OUTPATIENT
Start: 2024-12-11

## 2025-01-01 DIAGNOSIS — I10 PRIMARY HYPERTENSION: ICD-10-CM

## 2025-01-31 RX ORDER — AMLODIPINE BESYLATE 5 MG/1
5 TABLET ORAL DAILY
Qty: 90 TABLET | Refills: 0 | Status: SHIPPED | OUTPATIENT
Start: 2025-01-31

## 2025-01-31 RX ORDER — POTASSIUM CHLORIDE 750 MG/1
10 TABLET, EXTENDED RELEASE ORAL DAILY
Qty: 90 TABLET | Refills: 0 | Status: SHIPPED | OUTPATIENT
Start: 2025-01-31

## 2025-03-09 ENCOUNTER — APPOINTMENT (OUTPATIENT)
Dept: URGENT CARE | Age: OVER 89
End: 2025-03-09
Payer: MEDICARE

## 2025-04-02 ASSESSMENT — ENCOUNTER SYMPTOMS
NECK PAIN: 0
VOMITING: 0
SORE THROAT: 0
HEADACHES: 0
DIARRHEA: 0
ABDOMINAL PAIN: 0
COUGH: 0
RHINORRHEA: 0

## 2025-04-09 ENCOUNTER — OFFICE VISIT (OUTPATIENT)
Dept: OTOLARYNGOLOGY | Facility: CLINIC | Age: OVER 89
End: 2025-04-09
Payer: MEDICARE

## 2025-04-09 VITALS
DIASTOLIC BLOOD PRESSURE: 88 MMHG | WEIGHT: 157 LBS | SYSTOLIC BLOOD PRESSURE: 152 MMHG | TEMPERATURE: 97.1 F | BODY MASS INDEX: 26.8 KG/M2 | HEART RATE: 87 BPM | HEIGHT: 64 IN

## 2025-04-09 DIAGNOSIS — H93.8X2 SENSATION OF PLUGGED EAR ON LEFT SIDE: Primary | ICD-10-CM

## 2025-04-09 DIAGNOSIS — H61.22 EXCESSIVE CERUMEN IN LEFT EAR CANAL: ICD-10-CM

## 2025-04-09 PROCEDURE — 3077F SYST BP >= 140 MM HG: CPT | Performed by: NURSE PRACTITIONER

## 2025-04-09 PROCEDURE — 99203 OFFICE O/P NEW LOW 30 MIN: CPT | Performed by: NURSE PRACTITIONER

## 2025-04-09 PROCEDURE — 99213 OFFICE O/P EST LOW 20 MIN: CPT | Performed by: NURSE PRACTITIONER

## 2025-04-09 PROCEDURE — 3079F DIAST BP 80-89 MM HG: CPT | Performed by: NURSE PRACTITIONER

## 2025-04-09 PROCEDURE — 1126F AMNT PAIN NOTED NONE PRSNT: CPT | Performed by: NURSE PRACTITIONER

## 2025-04-09 PROCEDURE — 1159F MED LIST DOCD IN RCRD: CPT | Performed by: NURSE PRACTITIONER

## 2025-04-09 RX ORDER — OFLOXACIN 3 MG/ML
SOLUTION AURICULAR (OTIC)
Qty: 5 ML | Refills: 0 | Status: SHIPPED | OUTPATIENT
Start: 2025-04-09

## 2025-04-09 SDOH — ECONOMIC STABILITY: FOOD INSECURITY: WITHIN THE PAST 12 MONTHS, YOU WORRIED THAT YOUR FOOD WOULD RUN OUT BEFORE YOU GOT MONEY TO BUY MORE.: NEVER TRUE

## 2025-04-09 SDOH — ECONOMIC STABILITY: FOOD INSECURITY: WITHIN THE PAST 12 MONTHS, THE FOOD YOU BOUGHT JUST DIDN'T LAST AND YOU DIDN'T HAVE MONEY TO GET MORE.: NEVER TRUE

## 2025-04-09 ASSESSMENT — LIFESTYLE VARIABLES
HOW MANY STANDARD DRINKS CONTAINING ALCOHOL DO YOU HAVE ON A TYPICAL DAY: 1 OR 2
HOW OFTEN DO YOU HAVE A DRINK CONTAINING ALCOHOL: MONTHLY OR LESS
SKIP TO QUESTIONS 9-10: 1
AUDIT-C TOTAL SCORE: 1
HOW OFTEN DO YOU HAVE SIX OR MORE DRINKS ON ONE OCCASION: NEVER

## 2025-04-09 ASSESSMENT — PAIN SCALES - GENERAL: PAINLEVEL_OUTOF10: 0-NO PAIN

## 2025-04-09 ASSESSMENT — COLUMBIA-SUICIDE SEVERITY RATING SCALE - C-SSRS
6. HAVE YOU EVER DONE ANYTHING, STARTED TO DO ANYTHING, OR PREPARED TO DO ANYTHING TO END YOUR LIFE?: NO
2. HAVE YOU ACTUALLY HAD ANY THOUGHTS OF KILLING YOURSELF?: NO
1. IN THE PAST MONTH, HAVE YOU WISHED YOU WERE DEAD OR WISHED YOU COULD GO TO SLEEP AND NOT WAKE UP?: NO

## 2025-04-09 ASSESSMENT — ENCOUNTER SYMPTOMS
OCCASIONAL FEELINGS OF UNSTEADINESS: 0
DEPRESSION: 0
LOSS OF SENSATION IN FEET: 0

## 2025-04-09 ASSESSMENT — PATIENT HEALTH QUESTIONNAIRE - PHQ9
2. FEELING DOWN, DEPRESSED OR HOPELESS: NOT AT ALL
1. LITTLE INTEREST OR PLEASURE IN DOING THINGS: NOT AT ALL
SUM OF ALL RESPONSES TO PHQ9 QUESTIONS 1 AND 2: 0

## 2025-04-09 NOTE — PROGRESS NOTES
Subjective   Patient ID: Jami Pandey is a 89 y.o. female who presents for EAR CLEANING.    HPI  Patient here for her ears. Her pcp cleaned the ears a couple of weeks ago. She had a bad cold ~ 6 weeks ago. The right was totally blocked and the left partially. They are feeling better. Denies ear pain and drainage. Denies tinnitus.   Denies previous ear surgery. States she has had ruptured TM before.   She has some off balance at times.    I reviewed patient's past medical and surgical history.  Patient Active Problem List   Diagnosis    Acute stress disorder    Bilateral carpal tunnel syndrome    Dyspnea on exertion    Edema of both lower extremities    Abnormal electrocardiography    History of Mohs micrographic surgery for skin cancer    Hyperlipidemia    Hypertension    Hypothyroidism    Low back pain    OA (osteoarthritis) of finger    Osteoarthritis    Osteoarthritis of left ankle    Osteoporosis    Pain of thigh    Palpitations    Rosacea    Unsteadiness on feet    Vitamin D deficiency    Open wound of scalp, complicated    Carpal tunnel syndrome    Edema of lower extremity    Overweight with body mass index (BMI) 25.0-29.9     Past Surgical History:   Procedure Laterality Date    CATARACT EXTRACTION  09/08/2016    Cataract Extraction    HYSTERECTOMY  09/08/2016    Hysterectomy    TOTAL KNEE ARTHROPLASTY  09/08/2016    Knee Replacement    TUBAL LIGATION  10/28/2014    Tubal Ligation     Review of Systems    All other systems have been reviewed and are negative for complaints except for those mentioned in history of present illness, past medical history and problem list.    Objective   Physical Exam    Constitutional: No fever, chills, weight loss or weight gain  General appearance: Appears well, well-nourished, well groomed. No acute distress.    Communication: Normal communication    Psychiatric: Oriented to person, place and time. Normal mood and affect.    Neurologic: Cranial nerves II-XII grossly intact  and symmetric bilaterally.    Head and Face:  Head: Atraumatic with no masses, lesions or scarring.  Face: Normal symmetry. No scars or deformities.  TMJ: Normal, no trismus.    Eyes: Conjunctiva not edematous or erythematous. PERRLA    Right Ear: External inspection of ear with no deformity, scars, or masses. EAC is clear.  TM is intact with no sign of infection, effusion, or retraction.  No perforation seen.     Left Ear: External inspection of ear with no deformity, scars, or masses. EAC is clear.  TM is intact with no sign of infection, effusion, or retraction.  No perforation seen. There is hard cerumen/dried blood adherent to the TM.     Nose: External inspection of nose: No nasal lesions, lacerations or scars. Anterior rhinoscopy with limited visualization past the inferior turbinates. No tenderness on frontal or maxillary sinus palpation.    Oral Cavity/Mouth: Oral cavity and oropharynx mucosa moist and pink. No lesions or masses. Tonsils appear normal. Uvula is midline. Tongue with no masses or lesions. Tongue with good mobility. The oropharynx is clear.    Neck: Normal appearing, symmetric, trachea midline.     Cardiovascular: Examination of peripheral vascular system shows no clubbing or cyanosis.    Respiratory: No respiratory distress increased work of breathing. Inspection of the chest with symmetric chest expansion and normal respiratory effort.    Skin: No head and neck rashes.    Lymph nodes: No adenopathy.    Assessment/Plan   Diagnoses and all orders for this visit:  Sensation of plugged ear on left side  -     ofloxacin (Floxin) 0.3 % otic solution; Administer 4 drops into the left ear, twice daily, for the next 7 days  Excessive cerumen in left ear canal  -     ofloxacin (Floxin) 0.3 % otic solution; Administer 4 drops into the left ear, twice daily, for the next 7 days    She has very hard cerumen adherent to the TM. I recommend using Ofloxacin drops and returning to clinic next week to have  this removed so there is no damage done to the TM. We will obtain audiogram after.    All questions answered to patient satisfaction.        JOSE A Dallas-CNP 04/09/25 4:06 PM

## 2025-04-23 ENCOUNTER — CLINICAL SUPPORT (OUTPATIENT)
Dept: AUDIOLOGY | Facility: CLINIC | Age: OVER 89
End: 2025-04-23
Payer: MEDICARE

## 2025-04-23 ENCOUNTER — OFFICE VISIT (OUTPATIENT)
Dept: OTOLARYNGOLOGY | Facility: CLINIC | Age: OVER 89
End: 2025-04-23
Payer: MEDICARE

## 2025-04-23 VITALS
RESPIRATION RATE: 16 BRPM | DIASTOLIC BLOOD PRESSURE: 79 MMHG | HEIGHT: 64 IN | TEMPERATURE: 97.3 F | WEIGHT: 161.4 LBS | SYSTOLIC BLOOD PRESSURE: 148 MMHG | BODY MASS INDEX: 27.55 KG/M2 | HEART RATE: 71 BPM | OXYGEN SATURATION: 97 %

## 2025-04-23 DIAGNOSIS — H93.8X2 SENSATION OF PLUGGED EAR ON LEFT SIDE: Primary | ICD-10-CM

## 2025-04-23 DIAGNOSIS — H61.22 EXCESSIVE CERUMEN IN LEFT EAR CANAL: ICD-10-CM

## 2025-04-23 DIAGNOSIS — H61.23 CERUMINOSIS, BILATERAL: Primary | ICD-10-CM

## 2025-04-23 PROCEDURE — 3078F DIAST BP <80 MM HG: CPT | Performed by: NURSE PRACTITIONER

## 2025-04-23 PROCEDURE — 1036F TOBACCO NON-USER: CPT | Performed by: NURSE PRACTITIONER

## 2025-04-23 PROCEDURE — 1159F MED LIST DOCD IN RCRD: CPT | Performed by: NURSE PRACTITIONER

## 2025-04-23 PROCEDURE — 92557 COMPREHENSIVE HEARING TEST: CPT | Performed by: AUDIOLOGIST

## 2025-04-23 PROCEDURE — 3077F SYST BP >= 140 MM HG: CPT | Performed by: NURSE PRACTITIONER

## 2025-04-23 PROCEDURE — 99213 OFFICE O/P EST LOW 20 MIN: CPT | Performed by: NURSE PRACTITIONER

## 2025-04-23 PROCEDURE — 92550 TYMPANOMETRY & REFLEX THRESH: CPT | Mod: 52 | Performed by: AUDIOLOGIST

## 2025-04-23 PROCEDURE — 1126F AMNT PAIN NOTED NONE PRSNT: CPT | Performed by: NURSE PRACTITIONER

## 2025-04-23 ASSESSMENT — ENCOUNTER SYMPTOMS
OCCASIONAL FEELINGS OF UNSTEADINESS: 1
LOSS OF SENSATION IN FEET: 1
DEPRESSION: 0

## 2025-04-23 ASSESSMENT — PAIN SCALES - GENERAL: PAINLEVEL_OUTOF10: 0-NO PAIN

## 2025-04-23 NOTE — PROGRESS NOTES
Subjective   Patient ID: Jami Pandey is a 89 y.o. female who presents for Cerumen Impaction (Patient presents for follow up visit for ear cleaning, Patient stated she had ear drops that she competed.).    HPI  INITIAL VISIT 4/9/2025:  Patient here for her ears. Her pcp cleaned the ears a couple of weeks ago. She had a bad cold ~ 6 weeks ago. The right was totally blocked and the left partially. They are feeling better. Denies ear pain and drainage. Denies tinnitus.   Denies previous ear surgery. States she has had ruptured TM before.   She has some off balance at times.    4/23/2025: Patient following up for her left ear. She used the Ofloxacin drops. The ear is crackling.     I reviewed patient's past medical and surgical history.  Patient Active Problem List   Diagnosis    Acute stress disorder    Bilateral carpal tunnel syndrome    Dyspnea on exertion    Edema of both lower extremities    Abnormal electrocardiography    History of Mohs micrographic surgery for skin cancer    Hyperlipidemia    Hypertension    Hypothyroidism    Low back pain    OA (osteoarthritis) of finger    Osteoarthritis    Osteoarthritis of left ankle    Osteoporosis    Pain of thigh    Palpitations    Rosacea    Unsteadiness on feet    Vitamin D deficiency    Open wound of scalp, complicated    Carpal tunnel syndrome    Edema of lower extremity    Overweight with body mass index (BMI) 25.0-29.9     Past Surgical History:   Procedure Laterality Date    CATARACT EXTRACTION  09/08/2016    Cataract Extraction    HYSTERECTOMY  09/08/2016    Hysterectomy    TOTAL KNEE ARTHROPLASTY  09/08/2016    Knee Replacement    TUBAL LIGATION  10/28/2014    Tubal Ligation     Review of Systems    All other systems have been reviewed and are negative for complaints except for those mentioned in history of present illness, past medical history and problem list.    Objective   Physical Exam    Right Ear: External inspection of ear with no deformity, scars, or  masses. EAC is clear.  TM is intact with no sign of infection, effusion, or retraction.  No perforation seen.     Left Ear: External inspection of ear with no deformity, scars, or masses. EAC is with a hard piece of cerumen adherent to the TM. Sterile saline was used to help soften this and it was successfully removed. TM is intact with no sign of infection, effusion, or retraction.  No perforation seen.     Assessment/Plan   Diagnoses and all orders for this visit:  Sensation of plugged ear on left side  Excessive cerumen in left ear canal    Cerumen successfully removed. Patient notes improvement afterwards. Recommend using the Ofloxacin for the next 3-5 days.     She is obtaining audiogram after our visit today.   She may follow up with me as needed.    All questions answered to patient satisfaction.        JOSE A Dallas-CNP 04/23/25 11:24 AM

## 2025-04-23 NOTE — PROGRESS NOTES
"AUDIOLOGY ADULT AUDIOMETRIC EVALUATION      Name:  Jami Pandey  :  1932  Age:  89 y.o.  Date of Evaluation:  2025     HISTORY  Reason for visit:  2025  Ms. Pandey is seen 2025 at the request of Thuy Lind CNP for an evaluation of hearing.      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    RESULTS   Otoscopic Evaluation: clear canals bilaterally      Immittance Measures (226 Hz probe tone):      Right ear: Tympanometry is consistent with normal middle ear pressure and restricted tympanic membrane mobility.     Left ear: Tympanometry is consistent with normal middle ear pressure and normal tympanic membrane mobility.         Note wide, rounded traces bilaterally.      Ipsilateral acoustic reflexes were absent for 500-4000 Hz bilaterally.       Test technique:  standard behavioral technique via [facility] earphones.  Reliability is good.    Pure Tone Audiometry:    Right ear:  Hearing sensitivity is in the mild to severe hearing loss range.   Left ear: Hearing sensitivity is in the mild to profound hearing loss range.    Speech Audiometry:        Right Ear:  Speech Reception Threshold (SRT) was " obtained at 45 dBHL                 Speech discrimination score was 92% in quiet when words were presented at 95 dBHL      Left Ear:  Speech Reception Threshold (SRT) was obtained at 50 dBHL                 Speech discrimination score was 100% in quiet when words were presented at 90 dBHL    IMPRESSIONS:  Patient is expected to experience communication difficulty in all listening situations.  Patient is expected to benefit from devices that provide amplification (e.g., hearing aids) and improve the desired sound signal over that of background noise as well as from effective communication strategies.     RECOMMENDATIONS  Continue with medical follow-up with JOSE A Hall FNP-C.  Hearing Aid Evaluation with an audiologist to discuss hearing technology (such as hearing aids) and services.  Reassess hearing in 1 year (or sooner if medically indicated or if there is a concern for a change in hearing).    Continue with medical follow-up as indicated.      PATIENT EDUCATION  Discussed results and recommendations with patient.  Questions were addressed and the patient was encouraged to contact our department should concerns arise.       СЕРГЕЙ Mills, CCC-A  Licensed Audiologist

## 2025-05-03 LAB
25(OH)D3+25(OH)D2 SERPL-MCNC: 59 NG/ML (ref 30–100)
ALBUMIN SERPL-MCNC: 4.4 G/DL (ref 3.6–5.1)
ALP SERPL-CCNC: 67 U/L (ref 37–153)
ALT SERPL-CCNC: 13 U/L (ref 6–29)
ANION GAP SERPL CALCULATED.4IONS-SCNC: 10 MMOL/L (CALC) (ref 7–17)
AST SERPL-CCNC: 19 U/L (ref 10–35)
BILIRUB SERPL-MCNC: 0.6 MG/DL (ref 0.2–1.2)
BUN SERPL-MCNC: 33 MG/DL (ref 7–25)
CALCIUM SERPL-MCNC: 8.7 MG/DL (ref 8.6–10.4)
CHLORIDE SERPL-SCNC: 104 MMOL/L (ref 98–110)
CHOLEST SERPL-MCNC: 184 MG/DL
CHOLEST/HDLC SERPL: 3 (CALC)
CO2 SERPL-SCNC: 27 MMOL/L (ref 20–32)
CREAT SERPL-MCNC: 1.41 MG/DL (ref 0.6–0.95)
EGFRCR SERPLBLD CKD-EPI 2021: 35 ML/MIN/1.73M2
ERYTHROCYTE [DISTWIDTH] IN BLOOD BY AUTOMATED COUNT: 12.8 % (ref 11–15)
EST. AVERAGE GLUCOSE BLD GHB EST-MCNC: 123 MG/DL
EST. AVERAGE GLUCOSE BLD GHB EST-SCNC: 6.8 MMOL/L
GLUCOSE SERPL-MCNC: 96 MG/DL (ref 65–99)
HBA1C MFR BLD: 5.9 %
HCT VFR BLD AUTO: 39.2 % (ref 35–45)
HDLC SERPL-MCNC: 61 MG/DL
HGB BLD-MCNC: 12.7 G/DL (ref 11.7–15.5)
LDLC SERPL CALC-MCNC: 102 MG/DL (CALC)
MCH RBC QN AUTO: 29.3 PG (ref 27–33)
MCHC RBC AUTO-ENTMCNC: 32.4 G/DL (ref 32–36)
MCV RBC AUTO: 90.3 FL (ref 80–100)
NONHDLC SERPL-MCNC: 123 MG/DL (CALC)
PLATELET # BLD AUTO: 297 THOUSAND/UL (ref 140–400)
PMV BLD REES-ECKER: 9.7 FL (ref 7.5–12.5)
POTASSIUM SERPL-SCNC: 4.2 MMOL/L (ref 3.5–5.3)
PROT SERPL-MCNC: 7.2 G/DL (ref 6.1–8.1)
RBC # BLD AUTO: 4.34 MILLION/UL (ref 3.8–5.1)
SODIUM SERPL-SCNC: 141 MMOL/L (ref 135–146)
TRIGL SERPL-MCNC: 112 MG/DL
TSH SERPL-ACNC: 3.51 MIU/L (ref 0.4–4.5)
WBC # BLD AUTO: 6.3 THOUSAND/UL (ref 3.8–10.8)

## 2025-05-18 ENCOUNTER — HOSPITAL ENCOUNTER (EMERGENCY)
Facility: HOSPITAL | Age: OVER 89
Discharge: HOME | End: 2025-05-18
Attending: STUDENT IN AN ORGANIZED HEALTH CARE EDUCATION/TRAINING PROGRAM
Payer: MEDICARE

## 2025-05-18 ENCOUNTER — APPOINTMENT (OUTPATIENT)
Dept: RADIOLOGY | Facility: HOSPITAL | Age: OVER 89
End: 2025-05-18
Payer: MEDICARE

## 2025-05-18 ENCOUNTER — APPOINTMENT (OUTPATIENT)
Dept: CARDIOLOGY | Facility: HOSPITAL | Age: OVER 89
End: 2025-05-18
Payer: MEDICARE

## 2025-05-18 VITALS
HEART RATE: 93 BPM | HEIGHT: 63 IN | SYSTOLIC BLOOD PRESSURE: 148 MMHG | BODY MASS INDEX: 27.82 KG/M2 | OXYGEN SATURATION: 96 % | TEMPERATURE: 97.9 F | RESPIRATION RATE: 31 BRPM | DIASTOLIC BLOOD PRESSURE: 62 MMHG | WEIGHT: 157 LBS

## 2025-05-18 DIAGNOSIS — R03.0 ELEVATED BLOOD PRESSURE READING: Primary | ICD-10-CM

## 2025-05-18 LAB
ALBUMIN SERPL BCP-MCNC: 4.1 G/DL (ref 3.4–5)
ALP SERPL-CCNC: 63 U/L (ref 33–136)
ALT SERPL W P-5'-P-CCNC: 13 U/L (ref 7–45)
ANION GAP SERPL CALC-SCNC: 14 MMOL/L (ref 10–20)
AST SERPL W P-5'-P-CCNC: 19 U/L (ref 9–39)
BASOPHILS # BLD AUTO: 0.04 X10*3/UL (ref 0–0.1)
BASOPHILS NFR BLD AUTO: 0.7 %
BILIRUB SERPL-MCNC: 0.5 MG/DL (ref 0–1.2)
BNP SERPL-MCNC: 138 PG/ML (ref 0–99)
BUN SERPL-MCNC: 28 MG/DL (ref 6–23)
CALCIUM SERPL-MCNC: 8.5 MG/DL (ref 8.6–10.3)
CARDIAC TROPONIN I PNL SERPL HS: 7 NG/L (ref 0–13)
CARDIAC TROPONIN I PNL SERPL HS: 8 NG/L (ref 0–13)
CHLORIDE SERPL-SCNC: 106 MMOL/L (ref 98–107)
CO2 SERPL-SCNC: 24 MMOL/L (ref 21–32)
CREAT SERPL-MCNC: 1.33 MG/DL (ref 0.5–1.05)
EGFRCR SERPLBLD CKD-EPI 2021: 38 ML/MIN/1.73M*2
EOSINOPHIL # BLD AUTO: 0.03 X10*3/UL (ref 0–0.4)
EOSINOPHIL NFR BLD AUTO: 0.6 %
ERYTHROCYTE [DISTWIDTH] IN BLOOD BY AUTOMATED COUNT: 13.7 % (ref 11.5–14.5)
GLUCOSE SERPL-MCNC: 113 MG/DL (ref 74–99)
HCT VFR BLD AUTO: 38.7 % (ref 36–46)
HGB BLD-MCNC: 12.3 G/DL (ref 12–16)
IMM GRANULOCYTES # BLD AUTO: 0.02 X10*3/UL (ref 0–0.5)
IMM GRANULOCYTES NFR BLD AUTO: 0.4 % (ref 0–0.9)
LYMPHOCYTES # BLD AUTO: 1.26 X10*3/UL (ref 0.8–3)
LYMPHOCYTES NFR BLD AUTO: 23.2 %
MAGNESIUM SERPL-MCNC: 2.18 MG/DL (ref 1.6–2.4)
MCH RBC QN AUTO: 29.2 PG (ref 26–34)
MCHC RBC AUTO-ENTMCNC: 31.8 G/DL (ref 32–36)
MCV RBC AUTO: 92 FL (ref 80–100)
MONOCYTES # BLD AUTO: 0.44 X10*3/UL (ref 0.05–0.8)
MONOCYTES NFR BLD AUTO: 8.1 %
NEUTROPHILS # BLD AUTO: 3.64 X10*3/UL (ref 1.6–5.5)
NEUTROPHILS NFR BLD AUTO: 67 %
NRBC BLD-RTO: 0 /100 WBCS (ref 0–0)
PLATELET # BLD AUTO: 257 X10*3/UL (ref 150–450)
POTASSIUM SERPL-SCNC: 3.7 MMOL/L (ref 3.5–5.3)
PROT SERPL-MCNC: 7 G/DL (ref 6.4–8.2)
RBC # BLD AUTO: 4.21 X10*6/UL (ref 4–5.2)
SODIUM SERPL-SCNC: 140 MMOL/L (ref 136–145)
TSH SERPL-ACNC: 2 MIU/L (ref 0.44–3.98)
WBC # BLD AUTO: 5.4 X10*3/UL (ref 4.4–11.3)

## 2025-05-18 PROCEDURE — 85025 COMPLETE CBC W/AUTO DIFF WBC: CPT | Performed by: PHYSICIAN ASSISTANT

## 2025-05-18 PROCEDURE — 80053 COMPREHEN METABOLIC PANEL: CPT | Performed by: PHYSICIAN ASSISTANT

## 2025-05-18 PROCEDURE — 84484 ASSAY OF TROPONIN QUANT: CPT | Performed by: PHYSICIAN ASSISTANT

## 2025-05-18 PROCEDURE — 71045 X-RAY EXAM CHEST 1 VIEW: CPT | Performed by: STUDENT IN AN ORGANIZED HEALTH CARE EDUCATION/TRAINING PROGRAM

## 2025-05-18 PROCEDURE — 93005 ELECTROCARDIOGRAM TRACING: CPT

## 2025-05-18 PROCEDURE — 2500000004 HC RX 250 GENERAL PHARMACY W/ HCPCS (ALT 636 FOR OP/ED): Mod: JZ | Performed by: PHYSICIAN ASSISTANT

## 2025-05-18 PROCEDURE — 84443 ASSAY THYROID STIM HORMONE: CPT | Performed by: PHYSICIAN ASSISTANT

## 2025-05-18 PROCEDURE — 83880 ASSAY OF NATRIURETIC PEPTIDE: CPT | Performed by: PHYSICIAN ASSISTANT

## 2025-05-18 PROCEDURE — 70450 CT HEAD/BRAIN W/O DYE: CPT

## 2025-05-18 PROCEDURE — 36415 COLL VENOUS BLD VENIPUNCTURE: CPT | Performed by: PHYSICIAN ASSISTANT

## 2025-05-18 PROCEDURE — 71045 X-RAY EXAM CHEST 1 VIEW: CPT

## 2025-05-18 PROCEDURE — 70450 CT HEAD/BRAIN W/O DYE: CPT | Performed by: STUDENT IN AN ORGANIZED HEALTH CARE EDUCATION/TRAINING PROGRAM

## 2025-05-18 PROCEDURE — 99285 EMERGENCY DEPT VISIT HI MDM: CPT | Mod: 25 | Performed by: STUDENT IN AN ORGANIZED HEALTH CARE EDUCATION/TRAINING PROGRAM

## 2025-05-18 PROCEDURE — 96374 THER/PROPH/DIAG INJ IV PUSH: CPT

## 2025-05-18 PROCEDURE — 83735 ASSAY OF MAGNESIUM: CPT | Performed by: PHYSICIAN ASSISTANT

## 2025-05-18 RX ORDER — HYDRALAZINE HYDROCHLORIDE 20 MG/ML
5 INJECTION INTRAMUSCULAR; INTRAVENOUS ONCE
Status: COMPLETED | OUTPATIENT
Start: 2025-05-18 | End: 2025-05-18

## 2025-05-18 RX ADMIN — HYDRALAZINE HYDROCHLORIDE 5 MG: 20 INJECTION INTRAMUSCULAR; INTRAVENOUS at 12:40

## 2025-05-18 ASSESSMENT — COLUMBIA-SUICIDE SEVERITY RATING SCALE - C-SSRS
6. HAVE YOU EVER DONE ANYTHING, STARTED TO DO ANYTHING, OR PREPARED TO DO ANYTHING TO END YOUR LIFE?: NO
1. IN THE PAST MONTH, HAVE YOU WISHED YOU WERE DEAD OR WISHED YOU COULD GO TO SLEEP AND NOT WAKE UP?: NO
2. HAVE YOU ACTUALLY HAD ANY THOUGHTS OF KILLING YOURSELF?: NO

## 2025-05-18 ASSESSMENT — PAIN SCALES - GENERAL: PAINLEVEL_OUTOF10: 0 - NO PAIN

## 2025-05-18 ASSESSMENT — PAIN - FUNCTIONAL ASSESSMENT: PAIN_FUNCTIONAL_ASSESSMENT: 0-10

## 2025-05-18 NOTE — DISCHARGE INSTRUCTIONS
Please follow-up with your doctor in the next few days to see if there is any other further medication changes.  Please return if any worsening symptoms such as chest pain shortness of breath vomiting headache vision

## 2025-05-18 NOTE — ED PROVIDER NOTES
HPI   Chief Complaint   Patient presents with    Hypertension       HPI This a 89-year-old female with a history of primary hypertension who recently underwent a medication change from amlodipine 5 mg to hydralazine 25 mg.  She states her legs were more swollen and they are actually a bit better today.  She has a follow-up visit with her primary doctor Dr. Erick Alves in August.  She states that she has no chest pain shortness of breath headache or visual symptoms.  She does feel a bit shaky she states.  She is eating and drinking well.  No vomiting.  States that her legs actually look better today.  She used to be on Lasix but now she is on torsemide.  She also is on a cholesterol medicine.  She was going to go to her doctor on Tuesday to get yet another medication change.  She states she has seen a cardiologist in the past but not recently.        Patient History   Medical History[1]  Surgical History[2]  Family History[3]  Social History[4]    Physical Exam   ED Triage Vitals [05/18/25 1047]   Temperature Heart Rate Respirations BP   36.6 °C (97.9 °F) 94 18 (!) 182/79      Pulse Ox Temp Source Heart Rate Source Patient Position   95 % Temporal Monitor Sitting      BP Location FiO2 (%)     Right arm --       Physical Exam      Reviewed family history social history and allergies and were noncontributory to current problem.    Review of systems as noted in history of present illness  otherwise negative. All other systems were reviewed and negative.     PMHX: Chronic conditions: reviewd in EMR, relevant history noted in HPI                Surgeries, hospitalizations: reviewed in EMR , relevant history noted in HPI                Medications: reviewed in EMR, relevant history noted in HPI                Allergies: reviewed in EMR, relevant history noted in HPI      PHYSICAL EXAM:    GENERAL/ CONSTITUTIONAL: Vitals noted, no distress. Alert and oriented  x 3. Non-toxic.  No Drooling or stridor .    HEAD:  Normocephalic Atraumatic    EENT: Posterior oropharynx unremarkable. No meningismus. No LAD.  No exudate present.      EYES: PERRLA EOMI     NECK: Supple. Nontender. No midline tenderness.  Full range of motion    CARDIAC: Regular, rate, rhythm. No murmurs rubs or gallops. No JVD    PULMONARY: Lungs clear bilaterally with good aeration. No wheezes rales or rhonchi. No respiratory distress.     GI: Soft, . Nontender. No peritoneal signs. Normoactive bowel sounds. No pulsatile masses.  No guarding or rebound    EXTREMITIES/MUSCULOSKELTAL: BI peripheral edema left greater than right +1/4 . negative Homans bilaterally, NVIT, pulses +2 /4 equal. FROM in all extremities and equal.     SKIN: No rash. Intact.     NEURO: No focal neurologic deficits,     PSYCH: appropriate mood and affect    MEDICAL DECISION MAKING:    ED Course & MDM   ED Course as of 05/18/25 1412   Sun May 18, 2025   1208 Blood Urea Nitrogen(!): 28 [CV]   1208 Creatinine(!): 1.33 [CV]   1208 EGFR(!): 38 [CV]   1208 Consistent with previous [CV]   1339 No acute intracranial hemorrhage, mass effect, or CT apparent acute  infarct. Chronic microvascular ischemia and involutional changes.   [CV]   1339 No evidence of acute cardiopulmonary process [CV]      ED Course User Index  [CV] Shelby Mata PA-C         Diagnoses as of 05/18/25 1412   Elevated blood pressure reading        Patient had twelve-lead EKG performed showed normal sinus rhythm rate of 68 somewhat prolonged ME interval no STEMI interpreted by Dr. Aurora Valdovinos as well as chest x-ray basic labs ordered as well.    Patient blood pressure improved after 5 mg IV of hydralazine.  She is continue her medicines but follow-up on Tuesday as she stated with her primary doctor to see if there is any additional changes return if any worsening symptoms such as increased headache visual symptoms nausea vomiting , chest pain or shortness of breath         No data recorded     [place] Coma Scale Score: 15  (05/18/25 1130 : Shira Garcia, DEVORAH)                           Medical Decision Making    Ongoing follow-up with your primary care doctor may want to follow-up with a cardiologist as well  Procedure  Procedures       Shelby Mata PA-C  05/18/25 1412         [1]   Past Medical History:  Diagnosis Date    Abdominal distension (gaseous) 04/09/2018    Abdominal bloating    Abnormal weight loss     Weight loss, non-intentional    Acute upper respiratory infection, unspecified 11/19/2019    URI, acute    Carbuncle, unspecified 09/08/2016    Carbuncle    Disorder of the skin and subcutaneous tissue, unspecified 12/15/2014    Skin lesion of left leg    Menopausal and female climacteric states     Post menopausal syndrome    Other chest pain 11/28/2016    Chest pain, atypical    Overweight     Overweight (BMI 25.0-29.9)    Pain in left ankle and joints of left foot 07/13/2015    Left ankle pain    Personal history of (healed) traumatic fracture     History of fracture of pelvis    Personal history of (healed) traumatic fracture 11/10/2015    History of fracture of pelvis    Personal history of other medical treatment 07/13/2015    History of screening mammography    Unspecified adverse effect of drug or medicament, initial encounter (CODE) 08/25/2015    Medication side effects    Unspecified asthma, uncomplicated ([facility]) 12/15/2017    Asthmatic bronchitis    Unspecified open wound of scalp, sequela 12/17/2019    Open wound of scalp, unspecified open wound type, sequela   [2]   Past Surgical History:  Procedure Laterality Date    CATARACT EXTRACTION  09/08/2016    Cataract Extraction    HYSTERECTOMY  09/08/2016    Hysterectomy    TOTAL KNEE ARTHROPLASTY  09/08/2016    Knee Replacement    TUBAL LIGATION  10/28/2014    Tubal Ligation   [3]   Family History  Problem Relation Name Age of Onset    Hypertension Mother      Cancer Father     [4]   Social History  Tobacco Use    Smoking status: Former     Types:  Cigarettes    Smokeless tobacco: Never   Substance Use Topics    Alcohol use: Yes     Comment: social    Drug use: Never        Shelby Mata PA-C  05/18/25 [id number]

## 2025-05-18 NOTE — ED TRIAGE NOTES
PATIENT ARRIVES BY PRIVATE AUTO WITH COMPLAINTS OF HYPERTENSION. THE PATIENT STARTED A NEW BLOOD PRESSURE MEDICATION (HYDRALAZINE) AND STATES SHE'S HAVING A HARD TIME CONTROLLING HER BLOOD PRESSURE. SHE STATES SHE CHECKS HER BP DAILY. DENIES HEADACHES, CHEST PAIN, AND SOB.

## 2025-05-20 LAB
ATRIAL RATE: 68 BPM
P AXIS: 75 DEGREES
PR INTERVAL: 225 MS
Q ONSET: 251 MS
QRS COUNT: 11 BEATS
QRS DURATION: 95 MS
QT INTERVAL: 443 MS
QTC CALCULATION(BAZETT): 472 MS
QTC FREDERICIA: 462 MS
R AXIS: -13 DEGREES
T AXIS: 34 DEGREES
T OFFSET: 473 MS
VENTRICULAR RATE: 68 BPM

## 2025-06-26 PROBLEM — S01.00XA OPEN WOUND OF SCALP, COMPLICATED: Status: RESOLVED | Noted: 2019-12-07 | Resolved: 2025-06-26

## 2025-06-26 PROBLEM — R94.31 ABNORMAL ELECTROCARDIOGRAPHY: Status: RESOLVED | Noted: 2023-05-15 | Resolved: 2025-06-26

## 2025-06-26 PROBLEM — M54.50 LOW BACK PAIN: Status: RESOLVED | Noted: 2023-05-15 | Resolved: 2025-06-26

## 2025-06-26 PROBLEM — M79.659 PAIN OF THIGH: Status: RESOLVED | Noted: 2023-05-15 | Resolved: 2025-06-26

## 2025-06-26 PROBLEM — E66.3 OVERWEIGHT WITH BODY MASS INDEX (BMI) 25.0-29.9: Status: RESOLVED | Noted: 2024-05-20 | Resolved: 2025-06-26

## 2025-07-14 ENCOUNTER — APPOINTMENT (OUTPATIENT)
Dept: CARDIOLOGY | Facility: CLINIC | Age: OVER 89
End: 2025-07-14
Payer: MEDICARE

## 2025-07-26 ENCOUNTER — HOSPITAL ENCOUNTER (OUTPATIENT)
Facility: HOSPITAL | Age: OVER 89
Setting detail: OBSERVATION
DRG: 641 | End: 2025-07-26
Attending: STUDENT IN AN ORGANIZED HEALTH CARE EDUCATION/TRAINING PROGRAM | Admitting: INTERNAL MEDICINE
Payer: MEDICARE

## 2025-07-26 ENCOUNTER — APPOINTMENT (OUTPATIENT)
Dept: RADIOLOGY | Facility: HOSPITAL | Age: OVER 89
DRG: 641 | End: 2025-07-26
Payer: MEDICARE

## 2025-07-26 ENCOUNTER — APPOINTMENT (OUTPATIENT)
Dept: CARDIOLOGY | Facility: HOSPITAL | Age: OVER 89
DRG: 641 | End: 2025-07-26
Payer: MEDICARE

## 2025-07-26 DIAGNOSIS — R53.1 GENERALIZED WEAKNESS: Primary | ICD-10-CM

## 2025-07-26 DIAGNOSIS — R26.81 UNSTEADY GAIT: ICD-10-CM

## 2025-07-26 DIAGNOSIS — H93.8X2 SENSATION OF PLUGGED EAR ON LEFT SIDE: ICD-10-CM

## 2025-07-26 DIAGNOSIS — H61.22 EXCESSIVE CERUMEN IN LEFT EAR CANAL: ICD-10-CM

## 2025-07-26 DIAGNOSIS — R60.0 EDEMA OF BOTH LOWER EXTREMITIES: ICD-10-CM

## 2025-07-26 DIAGNOSIS — I10 PRIMARY HYPERTENSION: ICD-10-CM

## 2025-07-26 PROBLEM — E86.0 DEHYDRATION: Status: ACTIVE | Noted: 2025-07-26

## 2025-07-26 LAB
ALBUMIN SERPL BCP-MCNC: 4.4 G/DL (ref 3.4–5)
ALP SERPL-CCNC: 69 U/L (ref 33–136)
ALT SERPL W P-5'-P-CCNC: 12 U/L (ref 7–45)
ANION GAP SERPL CALC-SCNC: 13 MMOL/L (ref 10–20)
APPEARANCE UR: CLEAR
AST SERPL W P-5'-P-CCNC: 17 U/L (ref 9–39)
BASOPHILS # BLD AUTO: 0.06 X10*3/UL (ref 0–0.1)
BASOPHILS NFR BLD AUTO: 0.9 %
BILIRUB SERPL-MCNC: 0.7 MG/DL (ref 0–1.2)
BILIRUB UR STRIP.AUTO-MCNC: NEGATIVE MG/DL
BUN SERPL-MCNC: 26 MG/DL (ref 6–23)
CALCIUM SERPL-MCNC: 9.1 MG/DL (ref 8.6–10.3)
CARDIAC TROPONIN I PNL SERPL HS: 5 NG/L (ref 0–13)
CHLORIDE SERPL-SCNC: 103 MMOL/L (ref 98–107)
CO2 SERPL-SCNC: 27 MMOL/L (ref 21–32)
COLOR UR: COLORLESS
CREAT SERPL-MCNC: 1.28 MG/DL (ref 0.5–1.05)
EGFRCR SERPLBLD CKD-EPI 2021: 39 ML/MIN/1.73M*2
EOSINOPHIL # BLD AUTO: 0.08 X10*3/UL (ref 0–0.4)
EOSINOPHIL NFR BLD AUTO: 1.2 %
ERYTHROCYTE [DISTWIDTH] IN BLOOD BY AUTOMATED COUNT: 13.2 % (ref 11.5–14.5)
GLUCOSE SERPL-MCNC: 95 MG/DL (ref 74–99)
GLUCOSE UR STRIP.AUTO-MCNC: NORMAL MG/DL
HCT VFR BLD AUTO: 38.7 % (ref 36–46)
HGB BLD-MCNC: 12.2 G/DL (ref 12–16)
IMM GRANULOCYTES # BLD AUTO: 0.02 X10*3/UL (ref 0–0.5)
IMM GRANULOCYTES NFR BLD AUTO: 0.3 % (ref 0–0.9)
KETONES UR STRIP.AUTO-MCNC: NEGATIVE MG/DL
LEUKOCYTE ESTERASE UR QL STRIP.AUTO: ABNORMAL
LYMPHOCYTES # BLD AUTO: 1.99 X10*3/UL (ref 0.8–3)
LYMPHOCYTES NFR BLD AUTO: 30.9 %
MAGNESIUM SERPL-MCNC: 2.34 MG/DL (ref 1.6–2.4)
MCH RBC QN AUTO: 29.3 PG (ref 26–34)
MCHC RBC AUTO-ENTMCNC: 31.5 G/DL (ref 32–36)
MCV RBC AUTO: 93 FL (ref 80–100)
MONOCYTES # BLD AUTO: 0.51 X10*3/UL (ref 0.05–0.8)
MONOCYTES NFR BLD AUTO: 7.9 %
MUCOUS THREADS #/AREA URNS AUTO: ABNORMAL /LPF
NEUTROPHILS # BLD AUTO: 3.79 X10*3/UL (ref 1.6–5.5)
NEUTROPHILS NFR BLD AUTO: 58.8 %
NITRITE UR QL STRIP.AUTO: NEGATIVE
NRBC BLD-RTO: 0 /100 WBCS (ref 0–0)
PH UR STRIP.AUTO: 5.5 [PH]
PLATELET # BLD AUTO: 253 X10*3/UL (ref 150–450)
POTASSIUM SERPL-SCNC: 3.9 MMOL/L (ref 3.5–5.3)
PROT SERPL-MCNC: 7.6 G/DL (ref 6.4–8.2)
PROT UR STRIP.AUTO-MCNC: NEGATIVE MG/DL
RBC # BLD AUTO: 4.17 X10*6/UL (ref 4–5.2)
RBC # UR STRIP.AUTO: NEGATIVE MG/DL
RBC #/AREA URNS AUTO: ABNORMAL /HPF
SODIUM SERPL-SCNC: 139 MMOL/L (ref 136–145)
SP GR UR STRIP.AUTO: 1
UROBILINOGEN UR STRIP.AUTO-MCNC: NORMAL MG/DL
WBC # BLD AUTO: 6.5 X10*3/UL (ref 4.4–11.3)
WBC #/AREA URNS AUTO: ABNORMAL /HPF

## 2025-07-26 PROCEDURE — 71045 X-RAY EXAM CHEST 1 VIEW: CPT

## 2025-07-26 PROCEDURE — 71045 X-RAY EXAM CHEST 1 VIEW: CPT | Performed by: RADIOLOGY

## 2025-07-26 PROCEDURE — 70450 CT HEAD/BRAIN W/O DYE: CPT | Performed by: RADIOLOGY

## 2025-07-26 PROCEDURE — 93005 ELECTROCARDIOGRAM TRACING: CPT

## 2025-07-26 PROCEDURE — 2500000004 HC RX 250 GENERAL PHARMACY W/ HCPCS (ALT 636 FOR OP/ED): Performed by: PHYSICIAN ASSISTANT

## 2025-07-26 PROCEDURE — 85025 COMPLETE CBC W/AUTO DIFF WBC: CPT | Performed by: PHYSICIAN ASSISTANT

## 2025-07-26 PROCEDURE — G0378 HOSPITAL OBSERVATION PER HR: HCPCS

## 2025-07-26 PROCEDURE — 36415 COLL VENOUS BLD VENIPUNCTURE: CPT | Performed by: PHYSICIAN ASSISTANT

## 2025-07-26 PROCEDURE — 99285 EMERGENCY DEPT VISIT HI MDM: CPT | Mod: 25 | Performed by: STUDENT IN AN ORGANIZED HEALTH CARE EDUCATION/TRAINING PROGRAM

## 2025-07-26 PROCEDURE — 87086 URINE CULTURE/COLONY COUNT: CPT | Mod: PARLAB | Performed by: PHYSICIAN ASSISTANT

## 2025-07-26 PROCEDURE — 83735 ASSAY OF MAGNESIUM: CPT | Performed by: PHYSICIAN ASSISTANT

## 2025-07-26 PROCEDURE — 80053 COMPREHEN METABOLIC PANEL: CPT | Performed by: PHYSICIAN ASSISTANT

## 2025-07-26 PROCEDURE — 84484 ASSAY OF TROPONIN QUANT: CPT | Performed by: PHYSICIAN ASSISTANT

## 2025-07-26 PROCEDURE — 81001 URINALYSIS AUTO W/SCOPE: CPT | Performed by: PHYSICIAN ASSISTANT

## 2025-07-26 PROCEDURE — 96360 HYDRATION IV INFUSION INIT: CPT

## 2025-07-26 PROCEDURE — 70450 CT HEAD/BRAIN W/O DYE: CPT

## 2025-07-26 RX ORDER — SODIUM CHLORIDE 9 MG/ML
50 INJECTION, SOLUTION INTRAVENOUS CONTINUOUS
Status: ACTIVE | OUTPATIENT
Start: 2025-07-26 | End: 2025-07-28

## 2025-07-26 RX ORDER — CHOLECALCIFEROL (VITAMIN D3) 25 MCG
50 TABLET ORAL DAILY
Status: DISCONTINUED | OUTPATIENT
Start: 2025-07-27 | End: 2025-07-28 | Stop reason: HOSPADM

## 2025-07-26 RX ORDER — PRAVASTATIN SODIUM 20 MG/1
20 TABLET ORAL DAILY
Status: DISCONTINUED | OUTPATIENT
Start: 2025-07-27 | End: 2025-07-28 | Stop reason: HOSPADM

## 2025-07-26 RX ORDER — CHOLECALCIFEROL (VITAMIN D3) 50 MCG
1 TABLET ORAL DAILY
COMMUNITY

## 2025-07-26 RX ORDER — VIT C/E/ZN/COPPR/LUTEIN/ZEAXAN 250MG-90MG
1 CAPSULE ORAL 2 TIMES DAILY
COMMUNITY

## 2025-07-26 RX ORDER — AMLODIPINE BESYLATE 5 MG/1
5 TABLET ORAL DAILY
Status: DISCONTINUED | OUTPATIENT
Start: 2025-07-27 | End: 2025-07-28 | Stop reason: HOSPADM

## 2025-07-26 RX ORDER — ONDANSETRON HYDROCHLORIDE 2 MG/ML
4 INJECTION, SOLUTION INTRAVENOUS EVERY 6 HOURS PRN
Status: DISCONTINUED | OUTPATIENT
Start: 2025-07-26 | End: 2025-07-28 | Stop reason: HOSPADM

## 2025-07-26 RX ORDER — LANOLIN ALCOHOL/MO/W.PET/CERES
2500 CREAM (GRAM) TOPICAL DAILY
Status: DISCONTINUED | OUTPATIENT
Start: 2025-07-27 | End: 2025-07-28 | Stop reason: HOSPADM

## 2025-07-26 RX ORDER — ASPIRIN 81 MG/1
81 TABLET ORAL DAILY
Status: DISCONTINUED | OUTPATIENT
Start: 2025-07-27 | End: 2025-07-28 | Stop reason: HOSPADM

## 2025-07-26 RX ORDER — HEPARIN SODIUM 5000 [USP'U]/ML
5000 INJECTION, SOLUTION INTRAVENOUS; SUBCUTANEOUS EVERY 8 HOURS
Status: DISCONTINUED | OUTPATIENT
Start: 2025-07-26 | End: 2025-07-28 | Stop reason: HOSPADM

## 2025-07-26 RX ORDER — TORSEMIDE 10 MG/1
10 TABLET ORAL EVERY OTHER DAY
Status: DISCONTINUED | OUTPATIENT
Start: 2025-07-27 | End: 2025-07-28 | Stop reason: HOSPADM

## 2025-07-26 RX ORDER — POLYETHYLENE GLYCOL 3350 17 G/17G
17 POWDER, FOR SOLUTION ORAL DAILY PRN
Status: DISCONTINUED | OUTPATIENT
Start: 2025-07-26 | End: 2025-07-28 | Stop reason: HOSPADM

## 2025-07-26 RX ORDER — CEFTRIAXONE 1 G/50ML
1 INJECTION, SOLUTION INTRAVENOUS EVERY 24 HOURS
Status: DISCONTINUED | OUTPATIENT
Start: 2025-07-26 | End: 2025-07-28 | Stop reason: HOSPADM

## 2025-07-26 RX ORDER — ACETAMINOPHEN 325 MG/1
650 TABLET ORAL EVERY 4 HOURS PRN
Status: DISCONTINUED | OUTPATIENT
Start: 2025-07-26 | End: 2025-07-28 | Stop reason: HOSPADM

## 2025-07-26 RX ORDER — ACETAMINOPHEN 500 MG
5 TABLET ORAL NIGHTLY PRN
Status: DISCONTINUED | OUTPATIENT
Start: 2025-07-26 | End: 2025-07-28 | Stop reason: HOSPADM

## 2025-07-26 RX ORDER — POTASSIUM CHLORIDE 750 MG/1
10 TABLET, FILM COATED, EXTENDED RELEASE ORAL EVERY OTHER DAY
Status: DISCONTINUED | OUTPATIENT
Start: 2025-07-27 | End: 2025-07-28 | Stop reason: HOSPADM

## 2025-07-26 RX ADMIN — SODIUM CHLORIDE 500 ML: 0.9 INJECTION, SOLUTION INTRAVENOUS at 18:22

## 2025-07-26 ASSESSMENT — LIFESTYLE VARIABLES
TOTAL SCORE: 0
EVER HAD A DRINK FIRST THING IN THE MORNING TO STEADY YOUR NERVES TO GET RID OF A HANGOVER: NO
HAVE PEOPLE ANNOYED YOU BY CRITICIZING YOUR DRINKING: NO
HAVE YOU EVER FELT YOU SHOULD CUT DOWN ON YOUR DRINKING: NO
EVER FELT BAD OR GUILTY ABOUT YOUR DRINKING: NO

## 2025-07-26 ASSESSMENT — PAIN SCALES - GENERAL: PAINLEVEL_OUTOF10: 0 - NO PAIN

## 2025-07-26 ASSESSMENT — PAIN - FUNCTIONAL ASSESSMENT: PAIN_FUNCTIONAL_ASSESSMENT: 0-10

## 2025-07-26 NOTE — ED TRIAGE NOTES
PATIENT PRESENTS TO ED FROM HOME VIA PRIVATE AUTO FOR GEN WEAKNESS FOR X1 WEEK WITH WORSENING DIZZINESS TODAY. DENIES ROOM SPINNING, STATES OFF BALANCE. DENIES HEADACHE, VISION CHANGE, DIFFICULTY WITH SPEECH, UNILATERAL WEAKNESS. ENDORSES FEELING SHAKY. H(X) HTN.  DENIES CHEST PAIN, SOB, FEVER/ CHILLS, URINARY SYMPTOMS, N/V/D. NIH 0 AT TIME OF TRIAGE.

## 2025-07-26 NOTE — ED NOTES
Report received from DEVORAH Albert. This RN assumes care of patient at this time.     Tracie Bailey RN  07/26/25 [id number]

## 2025-07-26 NOTE — ED PROVIDER NOTES
HPI   Chief Complaint   Patient presents with    Weakness, Gen    Dizziness       89-year-old female with a significant past medical history of hypothyroidism, hyperlipidemia, hypertension, osteoporosis, and osteoarthritis presents complaining of unsteady gait and generalized weakness.  The patient states symptoms for the past week or so.  She reports feeling off balance when she walks.  She denies any falls.  She states that she currently lives at home alone.  She denies any chest pain or shortness of breath.  No reports of cough or fever.  Denies any UTI-like symptoms.  She suspects that her symptoms may be associated with her blood pressure medication.  She also reports for the past few weeks she has had trouble with her speech.  She denies any acute visual change.  No reports of tinnitus.               Patient History   Medical History[1]  Surgical History[2]  Family History[3]  Social History[4]    Physical Exam   ED Triage Vitals [07/26/25 1401]   Temperature Heart Rate Respirations BP   36.2 °C (97.2 °F) 75 18 149/70      Pulse Ox Temp Source Heart Rate Source Patient Position   94 % Temporal Monitor Sitting      BP Location FiO2 (%)     Right arm --       Physical Exam  Vitals and nursing note reviewed.   Constitutional:       General: She is not in acute distress.     Appearance: Normal appearance. She is normal weight. She is not ill-appearing, toxic-appearing or diaphoretic.   HENT:      Head: Normocephalic.      Nose: Nose normal.      Mouth/Throat:      Mouth: Mucous membranes are moist.     Eyes:      Extraocular Movements: Extraocular movements intact.      Conjunctiva/sclera: Conjunctivae normal.       Cardiovascular:      Rate and Rhythm: Normal rate and regular rhythm.      Pulses: Normal pulses.   Pulmonary:      Effort: Pulmonary effort is normal. No respiratory distress.      Breath sounds: Normal breath sounds.   Abdominal:      General: Abdomen is flat. Bowel sounds are normal. There is no  distension.      Palpations: Abdomen is soft.      Tenderness: There is no abdominal tenderness. There is no guarding or rebound.     Musculoskeletal:         General: Normal range of motion.      Cervical back: Normal range of motion and neck supple.     Skin:     General: Skin is warm and dry.      Capillary Refill: Capillary refill takes less than 2 seconds.     Neurological:      General: No focal deficit present.      Mental Status: She is alert and oriented to person, place, and time.     Psychiatric:         Mood and Affect: Mood normal.         Behavior: Behavior normal.         Thought Content: Thought content normal.         Judgment: Judgment normal.           ED Course & MDM   ED Course as of 07/26/25 1854   Sat Jul 26, 2025 1815 CXR  IMPRESSION:  No acute cardiopulmonary process evident.   [DS]   1815 WBC: 6.5 [DS]   1815 HEMOGLOBIN: 12.2 [DS]   1853 Comprehensive metabolic panel around baseline  [DS]   1853 MAGNESIUM: 2.34 [DS]   1853 Troponin I, High Sensitivity: 5 [DS]   1853 IMPRESSION:  Cerebral atrophy and chronic periventricular white matter small  vessel ischemic change.      No evidence of acute intracranial hemorrhage.   [DS]      ED Course User Index  [DS] Slick Pelayo PA-C         Diagnoses as of 07/26/25 1854   Generalized weakness   Unsteady gait                 No data recorded     [place] Coma Scale Score: 15 (07/26/25 1402 : Bety Duenas RN)                           Medical Decision Making    Medical Decision Making & ED Course  Medical Decision Making:  Patient found to be well-appearing on exam.  She is hemodynamically stable and afebrile.  She does not appear to be overtly ill or toxic.  She is in no obvious distress.  I reviewed the patient's electronic medical records.  On exam the patient was found to have no evidence of lateralizing deficit.  Cardiac workup was initiated along with CT imaging of the head.  Workup was nearly unremarkable.  No evidence of  leukocytosis or concerning anemia.  Troponin negative.  CT imaging of the head was found to be without acute findings.  Chest x-ray negative.  Patient was notified of the findings.  Patient received IV hydration during her stay.  She reported no change.  At this point I recommend admission for further evaluation.  I suspect she would benefit from neurology evaluation and possible PT OT assessment prior to discharge.  Patient was agreeable to this.  Patient be admitted to the service of Dr. Garcia Alves  --  Scoring Tools Utilized: None    Differential diagnoses considered include but are not limited to: Dehydration, Stroke, UTI     Social Determinants of Health which Significantly Impact Care: None identified The following actions were taken to address these social determinants: None    EKG Independent Interpretation: EKG interpreted by ED attending physician. Please see ED Course for full interpretation.    Independent Result Review and Interpretation: Relevant laboratory and radiographic results were reviewed and independently interpreted by myself.  As necessary, they are commented on in the ED Course.    Chronic conditions affecting the patient's care: As documented above in [facility]    The patient was discussed with the following consultants/services: Hospitalist/Admitting Provider who accepted the patient for admission    Care Considerations: As documented above in [facility]    ED Course:     Disposition  As a result of their workup, the patient will require admission to the hospital.  The patient was informed of her diagnosis.  The patient was given the opportunity to ask questions and I answered them. The patient agreed to be admitted to the hospital.      This was a shared visit with an ED attending.  The patient was seen and discussed with the ED attending    Slick Pelayo PA-C  Emergency Medicine            Procedure  Procedures       [1]   Past Medical History:  Diagnosis Date    Abdominal distension  (gaseous) 04/09/2018    Abdominal bloating    Abnormal weight loss     Weight loss, non-intentional    Acute upper respiratory infection, unspecified 11/19/2019    URI, acute    Carbuncle, unspecified 09/08/2016    Carbuncle    Disorder of the skin and subcutaneous tissue, unspecified 12/15/2014    Skin lesion of left leg    Menopausal and female climacteric states     Post menopausal syndrome    Other chest pain 11/28/2016    Chest pain, atypical    Overweight     Overweight (BMI 25.0-29.9)    Pain in left ankle and joints of left foot 07/13/2015    Left ankle pain    Personal history of (healed) traumatic fracture     History of fracture of pelvis    Personal history of (healed) traumatic fracture 11/10/2015    History of fracture of pelvis    Personal history of other medical treatment 07/13/2015    History of screening mammography    Unspecified adverse effect of drug or medicament, initial encounter (CODE) 08/25/2015    Medication side effects    Unspecified asthma, uncomplicated ([facility]) 12/15/2017    Asthmatic bronchitis    Unspecified open wound of scalp, sequela 12/17/2019    Open wound of scalp, unspecified open wound type, sequela   [2]   Past Surgical History:  Procedure Laterality Date    CATARACT EXTRACTION  09/08/2016    Cataract Extraction    HYSTERECTOMY  09/08/2016    Hysterectomy    TOTAL KNEE ARTHROPLASTY  09/08/2016    Knee Replacement    TUBAL LIGATION  10/28/2014    Tubal Ligation   [3]   Family History  Problem Relation Name Age of Onset    Hypertension Mother      Cancer Father     [4]   Social History  Tobacco Use    Smoking status: Former     Types: Cigarettes    Smokeless tobacco: Never   Substance Use Topics    Alcohol use: Yes     Comment: social    Drug use: Never        Slick Pelayo PA-C  07/26/25 [id number]

## 2025-07-26 NOTE — PROGRESS NOTES
Pharmacy Medication History Review    Jami Pandey is a 89 y.o. female admitted for Dehydration. Pharmacy reviewed the patient's havfx-ay-qioqzxxem medications and allergies for accuracy.    The list below reflectives the updated PTA list. Please review each medication in order reconciliation for additional clarification and justification.  Prior to Admission medications   Medication Sig Start Date End Date Authorizing Provider   amLODIPine (Norvasc) 5 mg tablet Take 1 tablet by mouth once daily   Garcia Alves DO   aspirin 81 mg EC tablet Take 1 tablet (81 mg) by mouth once daily.   Historical Provider, MD   cholecalciferol (Vitamin D-3) 50 mcg (2,000 units) tablet Take 1 tablet (50 mcg) by mouth once daily.   Historical Provider, MD   cyanocobalamin, vitamin B-12, 3,000 mcg capsule Take 1 capsule by mouth once daily.   Historical Provider, MD   levothyroxine (Synthroid, Levoxyl) 50 mcg tablet Take 1 tablet (50 mcg) by mouth once daily.   Garcia Alves,    potassium chloride CR 10 mEq ER tablet TAKE 1 TABLET BY MOUTH ONCE DAILY AS DIRECTED   Garcia Alves DO   pravastatin (Pravachol) 20 mg tablet Take 1 tablet (20 mg) by mouth once daily. as directed   Garcia Alves DO   torsemide (Demadex) 10 mg tablet Take 1 tablet (10 mg) by mouth once daily.   Garcia Alves, DO   vit C,A-Rq-pibco-lutein-zeaxan (PreserVision AREDS-2) 250-90-40-1 mg capsule Take 1 capsule by mouth 2 times a day.   Historical Provider, MD        The list below reflectives the updated allergy list. Please review each documented allergy for additional clarification and justification.  Allergies  Reviewed by Bety Duenas RN on 7/26/2025        Severity Reactions Comments    Amlodipine-benazepril Medium Palpitations, Dizziness     Hydralazine Medium Palpitations, Dizziness     Enalapril-hydrochlorothiazide Not Specified Unknown             Below are additional concerns with the patient's PTA  jenny.      Narda Thomas

## 2025-07-27 ENCOUNTER — APPOINTMENT (OUTPATIENT)
Dept: CARDIOLOGY | Facility: HOSPITAL | Age: OVER 89
DRG: 641 | End: 2025-07-27
Payer: MEDICARE

## 2025-07-27 VITALS
HEART RATE: 65 BPM | WEIGHT: 152 LBS | SYSTOLIC BLOOD PRESSURE: 170 MMHG | OXYGEN SATURATION: 96 % | BODY MASS INDEX: 26.93 KG/M2 | RESPIRATION RATE: 18 BRPM | DIASTOLIC BLOOD PRESSURE: 78 MMHG | TEMPERATURE: 98.8 F | HEIGHT: 63 IN

## 2025-07-27 PROBLEM — R53.1 GENERALIZED WEAKNESS: Status: ACTIVE | Noted: 2025-07-27

## 2025-07-27 PROBLEM — N39.0 URINARY TRACT INFECTION WITHOUT HEMATURIA: Status: ACTIVE | Noted: 2025-07-27

## 2025-07-27 PROBLEM — R47.81 SLURRED SPEECH: Status: ACTIVE | Noted: 2025-07-27

## 2025-07-27 LAB
ANION GAP SERPL CALC-SCNC: 14 MMOL/L (ref 10–20)
AORTIC VALVE MEAN GRADIENT: 7 MMHG
AORTIC VALVE PEAK VELOCITY: 1.8 M/S
APTT PPP: 28 SECONDS (ref 26–36)
AV PEAK GRADIENT: 13 MMHG
AVA (PEAK VEL): 1.35 CM2
AVA (VTI): 1.38 CM2
BNP SERPL-MCNC: 127 PG/ML (ref 0–99)
BUN SERPL-MCNC: 19 MG/DL (ref 6–23)
CALCIUM SERPL-MCNC: 9 MG/DL (ref 8.6–10.3)
CARDIAC TROPONIN I PNL SERPL HS: 10 NG/L (ref 0–13)
CARDIAC TROPONIN I PNL SERPL HS: 9 NG/L (ref 0–13)
CHLORIDE SERPL-SCNC: 105 MMOL/L (ref 98–107)
CO2 SERPL-SCNC: 28 MMOL/L (ref 21–32)
CREAT SERPL-MCNC: 1.11 MG/DL (ref 0.5–1.05)
EGFRCR SERPLBLD CKD-EPI 2021: 47 ML/MIN/1.73M*2
EJECTION FRACTION APICAL 4 CHAMBER: 67.3
EJECTION FRACTION: 68 %
ERYTHROCYTE [DISTWIDTH] IN BLOOD BY AUTOMATED COUNT: 13.2 % (ref 11.5–14.5)
GLUCOSE BLD MANUAL STRIP-MCNC: 103 MG/DL (ref 74–99)
GLUCOSE BLD MANUAL STRIP-MCNC: 107 MG/DL (ref 74–99)
GLUCOSE BLD MANUAL STRIP-MCNC: 108 MG/DL (ref 74–99)
GLUCOSE BLD MANUAL STRIP-MCNC: 142 MG/DL (ref 74–99)
GLUCOSE SERPL-MCNC: 92 MG/DL (ref 74–99)
HCT VFR BLD AUTO: 39.1 % (ref 36–46)
HGB BLD-MCNC: 12.1 G/DL (ref 12–16)
INR PPP: 0.9 (ref 0.9–1.1)
LEFT VENTRICLE INTERNAL DIMENSION DIASTOLE: 4.21 CM (ref 3.5–6)
LEFT VENTRICULAR OUTFLOW TRACT DIAMETER: 1.5 CM
MCH RBC QN AUTO: 28.7 PG (ref 26–34)
MCHC RBC AUTO-ENTMCNC: 30.9 G/DL (ref 32–36)
MCV RBC AUTO: 93 FL (ref 80–100)
MITRAL VALVE E/A RATIO: 0.76
NRBC BLD-RTO: 0 /100 WBCS (ref 0–0)
PLATELET # BLD AUTO: 269 X10*3/UL (ref 150–450)
POTASSIUM SERPL-SCNC: 3.7 MMOL/L (ref 3.5–5.3)
PROTHROMBIN TIME: 10.4 SECONDS (ref 9.8–12.4)
RBC # BLD AUTO: 4.21 X10*6/UL (ref 4–5.2)
RIGHT VENTRICLE FREE WALL PEAK S': 15 CM/S
RIGHT VENTRICLE PEAK SYSTOLIC PRESSURE: 32 MMHG
SODIUM SERPL-SCNC: 143 MMOL/L (ref 136–145)
TRICUSPID ANNULAR PLANE SYSTOLIC EXCURSION: 2.1 CM
TSH SERPL-ACNC: 1.49 MIU/L (ref 0.44–3.98)
WBC # BLD AUTO: 6.1 X10*3/UL (ref 4.4–11.3)

## 2025-07-27 PROCEDURE — 2500000002 HC RX 250 W HCPCS SELF ADMINISTERED DRUGS (ALT 637 FOR MEDICARE OP, ALT 636 FOR OP/ED): Performed by: PHYSICIAN ASSISTANT

## 2025-07-27 PROCEDURE — 80048 BASIC METABOLIC PNL TOTAL CA: CPT

## 2025-07-27 PROCEDURE — 36415 COLL VENOUS BLD VENIPUNCTURE: CPT

## 2025-07-27 PROCEDURE — G0378 HOSPITAL OBSERVATION PER HR: HCPCS

## 2025-07-27 PROCEDURE — 2500000004 HC RX 250 GENERAL PHARMACY W/ HCPCS (ALT 636 FOR OP/ED)

## 2025-07-27 PROCEDURE — 93306 TTE W/DOPPLER COMPLETE: CPT

## 2025-07-27 PROCEDURE — 2500000001 HC RX 250 WO HCPCS SELF ADMINISTERED DRUGS (ALT 637 FOR MEDICARE OP)

## 2025-07-27 PROCEDURE — 83880 ASSAY OF NATRIURETIC PEPTIDE: CPT

## 2025-07-27 PROCEDURE — 97161 PT EVAL LOW COMPLEX 20 MIN: CPT | Mod: GP

## 2025-07-27 PROCEDURE — 84484 ASSAY OF TROPONIN QUANT: CPT

## 2025-07-27 PROCEDURE — 96372 THER/PROPH/DIAG INJ SC/IM: CPT

## 2025-07-27 PROCEDURE — 93306 TTE W/DOPPLER COMPLETE: CPT | Performed by: INTERNAL MEDICINE

## 2025-07-27 PROCEDURE — 85610 PROTHROMBIN TIME: CPT

## 2025-07-27 PROCEDURE — 2500000002 HC RX 250 W HCPCS SELF ADMINISTERED DRUGS (ALT 637 FOR MEDICARE OP, ALT 636 FOR OP/ED)

## 2025-07-27 PROCEDURE — 99223 1ST HOSP IP/OBS HIGH 75: CPT | Performed by: NURSE PRACTITIONER

## 2025-07-27 PROCEDURE — 82947 ASSAY GLUCOSE BLOOD QUANT: CPT

## 2025-07-27 PROCEDURE — 99222 1ST HOSP IP/OBS MODERATE 55: CPT | Performed by: INTERNAL MEDICINE

## 2025-07-27 PROCEDURE — 85027 COMPLETE CBC AUTOMATED: CPT

## 2025-07-27 PROCEDURE — 84443 ASSAY THYROID STIM HORMONE: CPT

## 2025-07-27 PROCEDURE — 97165 OT EVAL LOW COMPLEX 30 MIN: CPT | Mod: GO

## 2025-07-27 PROCEDURE — 94760 N-INVAS EAR/PLS OXIMETRY 1: CPT

## 2025-07-27 RX ORDER — LEVOTHYROXINE SODIUM 50 UG/1
50 TABLET ORAL DAILY
Status: DISCONTINUED | OUTPATIENT
Start: 2025-07-27 | End: 2025-07-28 | Stop reason: HOSPADM

## 2025-07-27 RX ORDER — LABETALOL HYDROCHLORIDE 5 MG/ML
10 INJECTION, SOLUTION INTRAVENOUS EVERY 10 MIN PRN
Status: DISCONTINUED | OUTPATIENT
Start: 2025-07-27 | End: 2025-07-28 | Stop reason: HOSPADM

## 2025-07-27 RX ORDER — ATORVASTATIN CALCIUM 80 MG/1
80 TABLET, FILM COATED ORAL NIGHTLY
Status: DISCONTINUED | OUTPATIENT
Start: 2025-07-27 | End: 2025-07-28 | Stop reason: ALTCHOICE

## 2025-07-27 RX ADMIN — HEPARIN SODIUM 5000 UNITS: 5000 INJECTION, SOLUTION INTRAVENOUS; SUBCUTANEOUS at 23:47

## 2025-07-27 RX ADMIN — POTASSIUM CHLORIDE 10 MEQ: 750 TABLET, EXTENDED RELEASE ORAL at 08:17

## 2025-07-27 RX ADMIN — SODIUM CHLORIDE 50 ML/HR: 0.9 INJECTION, SOLUTION INTRAVENOUS at 01:24

## 2025-07-27 RX ADMIN — HEPARIN SODIUM 5000 UNITS: 5000 INJECTION, SOLUTION INTRAVENOUS; SUBCUTANEOUS at 15:25

## 2025-07-27 RX ADMIN — PRAVASTATIN SODIUM 20 MG: 20 TABLET ORAL at 08:17

## 2025-07-27 RX ADMIN — TORSEMIDE 10 MG: 10 TABLET ORAL at 08:17

## 2025-07-27 RX ADMIN — HEPARIN SODIUM 5000 UNITS: 5000 INJECTION, SOLUTION INTRAVENOUS; SUBCUTANEOUS at 01:24

## 2025-07-27 RX ADMIN — HEPARIN SODIUM 5000 UNITS: 5000 INJECTION, SOLUTION INTRAVENOUS; SUBCUTANEOUS at 06:30

## 2025-07-27 RX ADMIN — LEVOTHYROXINE SODIUM 50 MCG: 0.05 TABLET ORAL at 09:36

## 2025-07-27 RX ADMIN — ASPIRIN 81 MG: 81 TABLET, COATED ORAL at 08:17

## 2025-07-27 RX ADMIN — ATORVASTATIN CALCIUM 80 MG: 80 TABLET, FILM COATED ORAL at 20:42

## 2025-07-27 RX ADMIN — AMLODIPINE BESYLATE 5 MG: 5 TABLET ORAL at 08:16

## 2025-07-27 RX ADMIN — Medication 5 MG: at 01:24

## 2025-07-27 SDOH — ECONOMIC STABILITY: HOUSING INSECURITY: IN THE LAST 12 MONTHS, WAS THERE A TIME WHEN YOU WERE NOT ABLE TO PAY THE MORTGAGE OR RENT ON TIME?: NO

## 2025-07-27 SDOH — HEALTH STABILITY: PHYSICAL HEALTH
HOW OFTEN DO YOU NEED TO HAVE SOMEONE HELP YOU WHEN YOU READ INSTRUCTIONS, PAMPHLETS, OR OTHER WRITTEN MATERIAL FROM YOUR DOCTOR OR PHARMACY?: NEVER

## 2025-07-27 SDOH — ECONOMIC STABILITY: FOOD INSECURITY: WITHIN THE PAST 12 MONTHS, YOU WORRIED THAT YOUR FOOD WOULD RUN OUT BEFORE YOU GOT THE MONEY TO BUY MORE.: NEVER TRUE

## 2025-07-27 SDOH — SOCIAL STABILITY: SOCIAL INSECURITY: HAVE YOU HAD THOUGHTS OF HARMING ANYONE ELSE?: NO

## 2025-07-27 SDOH — SOCIAL STABILITY: SOCIAL NETWORK: IN A TYPICAL WEEK, HOW MANY TIMES DO YOU TALK ON THE PHONE WITH FAMILY, FRIENDS, OR NEIGHBORS?: TWICE A WEEK

## 2025-07-27 SDOH — SOCIAL STABILITY: SOCIAL INSECURITY: WITHIN THE LAST YEAR, HAVE YOU BEEN HUMILIATED OR EMOTIONALLY ABUSED IN OTHER WAYS BY YOUR PARTNER OR EX-PARTNER?: NO

## 2025-07-27 SDOH — SOCIAL STABILITY: SOCIAL INSECURITY: WITHIN THE LAST YEAR, HAVE YOU BEEN AFRAID OF YOUR PARTNER OR EX-PARTNER?: NO

## 2025-07-27 SDOH — HEALTH STABILITY: MENTAL HEALTH: HOW OFTEN DO YOU HAVE SIX OR MORE DRINKS ON ONE OCCASION?: NEVER

## 2025-07-27 SDOH — SOCIAL STABILITY: SOCIAL INSECURITY: HAVE YOU HAD ANY THOUGHTS OF HARMING ANYONE ELSE?: NO

## 2025-07-27 SDOH — ECONOMIC STABILITY: FOOD INSECURITY: WITHIN THE PAST 12 MONTHS, THE FOOD YOU BOUGHT JUST DIDN'T LAST AND YOU DIDN'T HAVE MONEY TO GET MORE.: NEVER TRUE

## 2025-07-27 SDOH — ECONOMIC STABILITY: INCOME INSECURITY: IN THE PAST 12 MONTHS HAS THE ELECTRIC, GAS, OIL, OR WATER COMPANY THREATENED TO SHUT OFF SERVICES IN YOUR HOME?: NO

## 2025-07-27 SDOH — HEALTH STABILITY: MENTAL HEALTH: HOW OFTEN DO YOU HAVE A DRINK CONTAINING ALCOHOL?: NEVER

## 2025-07-27 SDOH — HEALTH STABILITY: MENTAL HEALTH: HOW MANY DRINKS CONTAINING ALCOHOL DO YOU HAVE ON A TYPICAL DAY WHEN YOU ARE DRINKING?: PATIENT DOES NOT DRINK

## 2025-07-27 SDOH — ECONOMIC STABILITY: TRANSPORTATION INSECURITY: IN THE PAST 12 MONTHS, HAS LACK OF TRANSPORTATION KEPT YOU FROM MEDICAL APPOINTMENTS OR FROM GETTING MEDICATIONS?: NO

## 2025-07-27 SDOH — SOCIAL STABILITY: SOCIAL INSECURITY
WITHIN THE LAST YEAR, HAVE YOU BEEN RAPED OR FORCED TO HAVE ANY KIND OF SEXUAL ACTIVITY BY YOUR PARTNER OR EX-PARTNER?: NO

## 2025-07-27 SDOH — ECONOMIC STABILITY: HOUSING INSECURITY: AT ANY TIME IN THE PAST 12 MONTHS, WERE YOU HOMELESS OR LIVING IN A SHELTER (INCLUDING NOW)?: NO

## 2025-07-27 SDOH — SOCIAL STABILITY: SOCIAL NETWORK
DO YOU BELONG TO ANY CLUBS OR ORGANIZATIONS SUCH AS CHURCH GROUPS, UNIONS, FRATERNAL OR ATHLETIC GROUPS, OR SCHOOL GROUPS?: NO

## 2025-07-27 SDOH — ECONOMIC STABILITY: FOOD INSECURITY: HOW HARD IS IT FOR YOU TO PAY FOR THE VERY BASICS LIKE FOOD, HOUSING, MEDICAL CARE, AND HEATING?: NOT HARD AT ALL

## 2025-07-27 SDOH — HEALTH STABILITY: PHYSICAL HEALTH: ON AVERAGE, HOW MANY MINUTES DO YOU ENGAGE IN EXERCISE AT THIS LEVEL?: 30 MIN

## 2025-07-27 SDOH — SOCIAL STABILITY: SOCIAL INSECURITY
WITHIN THE LAST YEAR, HAVE YOU BEEN KICKED, HIT, SLAPPED, OR OTHERWISE PHYSICALLY HURT BY YOUR PARTNER OR EX-PARTNER?: NO

## 2025-07-27 SDOH — SOCIAL STABILITY: SOCIAL INSECURITY: WERE YOU ABLE TO COMPLETE ALL THE BEHAVIORAL HEALTH SCREENINGS?: YES

## 2025-07-27 SDOH — SOCIAL STABILITY: SOCIAL INSECURITY: ARE YOU MARRIED, WIDOWED, DIVORCED, SEPARATED, NEVER MARRIED, OR LIVING WITH A PARTNER?: WIDOWED

## 2025-07-27 SDOH — SOCIAL STABILITY: SOCIAL INSECURITY: ARE YOU OR HAVE YOU BEEN THREATENED OR ABUSED PHYSICALLY, EMOTIONALLY, OR SEXUALLY BY ANYONE?: NO

## 2025-07-27 SDOH — HEALTH STABILITY: MENTAL HEALTH
DO YOU FEEL STRESS - TENSE, RESTLESS, NERVOUS, OR ANXIOUS, OR UNABLE TO SLEEP AT NIGHT BECAUSE YOUR MIND IS TROUBLED ALL THE TIME - THESE DAYS?: NOT AT ALL

## 2025-07-27 SDOH — HEALTH STABILITY: PHYSICAL HEALTH: ON AVERAGE, HOW MANY DAYS PER WEEK DO YOU ENGAGE IN MODERATE TO STRENUOUS EXERCISE (LIKE A BRISK WALK)?: 3 DAYS

## 2025-07-27 SDOH — SOCIAL STABILITY: SOCIAL INSECURITY: DO YOU FEEL ANYONE HAS EXPLOITED OR TAKEN ADVANTAGE OF YOU FINANCIALLY OR OF YOUR PERSONAL PROPERTY?: NO

## 2025-07-27 SDOH — SOCIAL STABILITY: SOCIAL INSECURITY: DO YOU FEEL UNSAFE GOING BACK TO THE PLACE WHERE YOU ARE LIVING?: NO

## 2025-07-27 SDOH — SOCIAL STABILITY: SOCIAL NETWORK: HOW OFTEN DO YOU GET TOGETHER WITH FRIENDS OR RELATIVES?: TWICE A WEEK

## 2025-07-27 SDOH — SOCIAL STABILITY: SOCIAL INSECURITY: ABUSE: ADULT

## 2025-07-27 SDOH — SOCIAL STABILITY: SOCIAL NETWORK: HOW OFTEN DO YOU ATTEND MEETINGS OF THE CLUBS OR ORGANIZATIONS YOU BELONG TO?: NEVER

## 2025-07-27 SDOH — SOCIAL STABILITY: SOCIAL INSECURITY: ARE THERE ANY APPARENT SIGNS OF INJURIES/BEHAVIORS THAT COULD BE RELATED TO ABUSE/NEGLECT?: NO

## 2025-07-27 SDOH — SOCIAL STABILITY: SOCIAL INSECURITY: HAS ANYONE EVER THREATENED TO HURT YOUR FAMILY OR YOUR PETS?: NO

## 2025-07-27 SDOH — ECONOMIC STABILITY: HOUSING INSECURITY: IN THE PAST 12 MONTHS, HOW MANY TIMES HAVE YOU MOVED WHERE YOU WERE LIVING?: 0

## 2025-07-27 SDOH — SOCIAL STABILITY: SOCIAL NETWORK: HOW OFTEN DO YOU ATTEND CHURCH OR RELIGIOUS SERVICES?: MORE THAN 4 TIMES PER YEAR

## 2025-07-27 SDOH — SOCIAL STABILITY: SOCIAL INSECURITY: DOES ANYONE TRY TO KEEP YOU FROM HAVING/CONTACTING OTHER FRIENDS OR DOING THINGS OUTSIDE YOUR HOME?: NO

## 2025-07-27 ASSESSMENT — COGNITIVE AND FUNCTIONAL STATUS - GENERAL
MOBILITY SCORE: 24
TURNING FROM BACK TO SIDE WHILE IN FLAT BAD: A LITTLE
HELP NEEDED FOR BATHING: A LITTLE
STANDING UP FROM CHAIR USING ARMS: A LITTLE
MOBILITY SCORE: 24
TOILETING: A LITTLE
WALKING IN HOSPITAL ROOM: A LITTLE
PATIENT BASELINE BEDBOUND: NO
MOVING TO AND FROM BED TO CHAIR: A LITTLE
DRESSING REGULAR LOWER BODY CLOTHING: A LITTLE
DAILY ACTIVITIY SCORE: 19
MOBILITY SCORE: 18
CLIMB 3 TO 5 STEPS WITH RAILING: A LITTLE
MOVING FROM LYING ON BACK TO SITTING ON SIDE OF FLAT BED WITH BEDRAILS: A LITTLE
PERSONAL GROOMING: A LITTLE
DAILY ACTIVITIY SCORE: 24
DRESSING REGULAR UPPER BODY CLOTHING: A LITTLE

## 2025-07-27 ASSESSMENT — LIFESTYLE VARIABLES
HOW OFTEN DO YOU HAVE 6 OR MORE DRINKS ON ONE OCCASION: LESS THAN MONTHLY
SKIP TO QUESTIONS 9-10: 0
HOW OFTEN DO YOU HAVE A DRINK CONTAINING ALCOHOL: MONTHLY OR LESS
PRESCIPTION_ABUSE_PAST_12_MONTHS: NO
SUBSTANCE_ABUSE_PAST_12_MONTHS: NO
AUDIT-C TOTAL SCORE: 0
AUDIT-C TOTAL SCORE: 2
AUDIT-C TOTAL SCORE: 2
HOW MANY STANDARD DRINKS CONTAINING ALCOHOL DO YOU HAVE ON A TYPICAL DAY: 1 OR 2
SKIP TO QUESTIONS 9-10: 1

## 2025-07-27 ASSESSMENT — ACTIVITIES OF DAILY LIVING (ADL)
LACK_OF_TRANSPORTATION: NO
LACK_OF_TRANSPORTATION: NO
BATHING: INDEPENDENT
FEEDING YOURSELF: INDEPENDENT
GROOMING: INDEPENDENT
JUDGMENT_ADEQUATE_SAFELY_COMPLETE_DAILY_ACTIVITIES: YES
HEARING - LEFT EAR: FUNCTIONAL
DRESSING YOURSELF: INDEPENDENT
HEARING - RIGHT EAR: FUNCTIONAL
WALKS IN HOME: INDEPENDENT
PATIENT'S MEMORY ADEQUATE TO SAFELY COMPLETE DAILY ACTIVITIES?: YES
TOILETING: INDEPENDENT
ADEQUATE_TO_COMPLETE_ADL: YES

## 2025-07-27 ASSESSMENT — PAIN - FUNCTIONAL ASSESSMENT
PAIN_FUNCTIONAL_ASSESSMENT: 0-10

## 2025-07-27 ASSESSMENT — PATIENT HEALTH QUESTIONNAIRE - PHQ9
1. LITTLE INTEREST OR PLEASURE IN DOING THINGS: NOT AT ALL
SUM OF ALL RESPONSES TO PHQ9 QUESTIONS 1 & 2: 0
2. FEELING DOWN, DEPRESSED OR HOPELESS: NOT AT ALL

## 2025-07-27 ASSESSMENT — PAIN SCALES - GENERAL
PAINLEVEL_OUTOF10: 0 - NO PAIN

## 2025-07-27 NOTE — ED NOTES
Pt is resting in bed asleep with equal chest rise and fall noted. Respirations regular easy unlabored on room air. Skin pink warm and dry. NAD noted during rounding. X2 side rails up, bed is locked and in lowest position. Call light and personal items within reach. No further needs voiced at this time, care ongoing.        Tracie Bailey RN  07/26/25 [id number]

## 2025-07-27 NOTE — ED NOTES
Pt is resting in bed awake with equal chest rise and fall noted. Respirations regular easy unlabored on room air. Skin pink warm and dry. NAD noted during rounding. X2 side rails up, bed is locked and in lowest position. Call light and personal items within reach. No further needs voiced at this time, care ongoing.     Son remains at bedside.      Tracie Bailey RN  07/26/25 [id number]

## 2025-07-27 NOTE — CARE PLAN
The patient's goals for the shift include comfort and safety    The clinical goals for the shift include safety    Over the shift, the patient did not make progress toward the following goals. Barriers to progression include neuro checks every 4 hours

## 2025-07-27 NOTE — CONSULTS
[facility] Consult Note    Inpatient consult to Neurology  Consult performed by: AB Parada  Consult ordered by: AB Ryan         Subjective   Jami Pandey is a 89 y.o. female on day 0 of admission with a history of basal cell CA, hypothyroidism, osteoporosis, BLE edema, hypertension, and hyperlipidemia presenting with Dehydration. Neurology was consulted for slurred speech and feeling off balance.    Patient lying in bed awake and alert.  Both of her sons at bedside.  Patient reports that she is feeling much better today than when she came into the emergency room yesterday.  Does not feel as off-balance and feels like her speech is better than it was previously as well.  Patient reports that she was started on antibiotics for UTI.    History obtained from medical chart, patient, sons at bedside.    Medical History[1]  Surgical History[2]  No relevant family history has been documented for this patient.   Social History     Substance and Sexual Activity   Drug Use Never     Tobacco Use: Medium Risk (7/26/2025)    Patient History     Smoking Tobacco Use: Former     Smokeless Tobacco Use: Never     Passive Exposure: Not on file     Alcohol Use: Not At Risk (7/27/2025)    AUDIT-C     Frequency of Alcohol Consumption: Monthly or less     Average Number of Drinks: 1 or 2     Frequency of Binge Drinking: Less than monthly       10 point review of systems performed and is negative except as noted in the HPI.        Objective   Vitals:    07/27/25 0408 07/27/25 0539 07/27/25 0759 07/27/25 1247   BP: (!) 185/77  161/74 169/78   BP Location:   Right arm Right arm   Patient Position:   Lying Lying   Pulse: 59  60 63   Resp: 17      Temp: 36.6 °C (97.9 °F)  35.7 °C (96.3 °F) 36 °C (96.8 °F)   TempSrc: Temporal  Temporal Temporal   SpO2: 98% 98% 96% 96%   Weight:       Height:             Physical Exam  Neurological Exam    Scheduled medications  Scheduled Medications[3]  Continuous  medications  Continuous Medications[4]  PRN medications  PRN Medications[5]     Lab Results   Component Value Date    WBC 6.1 07/27/2025    RBC 4.21 07/27/2025    HGB 12.1 07/27/2025    HCT 39.1 07/27/2025     07/27/2025     07/27/2025    K 3.7 07/27/2025     07/27/2025    BUN 19 07/27/2025    CREATININE 1.11 (H) 07/27/2025    EGFR 47 (L) 07/27/2025    CALCIUM 9.0 07/27/2025    ALKPHOS 69 07/26/2025    AST 17 07/26/2025    ALT 12 07/26/2025    MG 2.34 07/26/2025    [id number] >2,000 (H) 05/20/2024    VITD25 59 05/02/2025    HGBA1C 5.9 (H) 05/02/2025    LDLCALC 102 (H) 05/02/2025    CHOL 184 05/02/2025    HDL 61 05/02/2025    TRIG 112 05/02/2025    TSH 1.49 07/27/2025    TSH 2.00 05/18/2025    TSH 3.51 05/02/2025       Below CT and MRI images and reports have been personally reviewed in PACS, agree with interpretations.    CT head wo IV contrast 07/26/2025    Narrative  Interpreted By:  Gurmeet Hua,  STUDY:  CT HEAD WO IV CONTRAST;  7/26/2025 6:20 pm    INDICATION:  Signs/Symptoms:dizzy.    COMPARISON:  5/18/2025    ACCESSION NUMBER(S):  [id number]    ORDERING CLINICIAN:  GEORGE CRAFT    TECHNIQUE:  Contiguous axial images of the head were obtained without intravenous  contrast.    FINDINGS:  BRAIN PARENCHYMA:  There is cerebral atrophy and chronic  periventricular white matter small vessel ischemic change. The gray  white matter differentiation is preserved.  No mass effect or midline  shift. Stable mild prominence of CSF space in the left lateral aspect  of the posterior fossa which may correspond to an arachnoid cyst.    HEMORRHAGE:  No evidence of acute intracranial hemorrhage.  VENTRICLES AND EXTRA-AXIAL SPACES:  The ventricles are within normal  limits in size for brain volume.  No evidence of abnormal extraaxial  fluid collection. EXTRACRANIAL SOFT TISSUES:  Within normal limits.  PARANASAL SINUSES/MASTOIDS:  The visualized paranasal sinuses and  mastoid air cells are clear and well  pneumatized. CALVARIUM:  No  evidence of depressed calvarial fracture.    OTHER FINDINGS:  None    Impression  Cerebral atrophy and chronic periventricular white matter small  vessel ischemic change.    No evidence of acute intracranial hemorrhage.    MACRO:  None    Signed by: Gurmeet Hua 7/26/2025 6:50 PM  Dictation workstation:   [id number]      CT head wo IV contrast 05/18/2025    Narrative  Interpreted By:  Xiang Downey,  STUDY:  CT HEAD WO IV CONTRAST;  5/18/2025 12:40 pm    INDICATION:  Signs/Symptoms:elevated BP.    COMPARISON:  None.    ACCESSION NUMBER(S):  [id number]    ORDERING CLINICIAN:  HUGO CARPENTER    TECHNIQUE:  Noncontrast axial CT scan of head was performed.    FINDINGS:  Parenchyma: There is no intracranial hemorrhage. The grey-white  differentiation is intact. There is no mass effect or midline shift.  Patchy supratentorial hypodensity, nonspecific, but likely secondary  to mild chronic microvascular ischemia.    CSF Spaces: Mild generalized volume loss, with concordant ventricular  enlargement.    Extra-Axial Fluid: There is no extraaxial fluid collection.    Calvarium: The calvarium is unremarkable.    Paranasal sinuses: Visualized paranasal sinuses are clear.    Mastoids: Clear.    Orbits: Normal.    Soft tissues: Unremarkable.    Impression  No acute intracranial hemorrhage, mass effect, or CT apparent acute  infarct. Chronic microvascular ischemia and involutional changes.    Signed by: Xiang Downey 5/18/2025 1:12 PM  Dictation workstation:   [id number]      NIH Stroke Scale  1A. Level of Consciousness: Alert, Keenly Responsive  1B. Ask Month and Age: Both Questions Right  1C. Blink Eyes & Squeeze Hands: Performs Both Tasks  2. Best Gaze: Normal  3. Visual: No Visual Loss  4. Facial Palsy: Normal Symmetrical Movements  5A. Motor - Left Arm: No Drift  5B. Motor - Right Arm: No Drift  6A. Motor - Left Leg: No Drift  6B. Motor - Right Leg: No Drift  7. Limb Ataxia:  Absent  8. Sensory Loss: Normal  9. Best Language: No Aphasia  10. Dysarthria: Normal  11. Extinction and Inattention: No Abnormality  NIH Stroke Scale: 0      [place] Coma Scale  Best Eye Response: Spontaneous  Best Verbal Response: Oriented  Best Motor Response: Follows commands  Laura Coma Scale Score: 15        Assessment/Plan      Assessment & Plan  Dehydration    HTN (hypertension)    Urinary tract infection without hematuria    Generalized weakness    Slurred speech      IMPRESSION:  Jami Pandey is a 89 y.o. female on day 0 of admission with a history of basal cell CA, hypothyroidism, osteoporosis, BLE edema, hypertension, and hyperlipidemia presenting with Dehydration. Neurology was consulted for slurred speech and feeling off balance.    Patient lying in bed awake and alert.  Both of her sons at bedside.  Patient reports that she is feeling much better today than when she came into the emergency room yesterday.  Does not feel as off-balance and feels like her speech is better than it was previously as well.  Patient reports that she was started on antibiotics for UTI.    TSH -1.49  BNP -127  Hgb -12.1  Hematocrit -39.1  Platelets -269  Cholesterol -184  LDL -102  Triglycerides -112  CT head - Cerebral atrophy and chronic periventricular white matter small       vessel ischemic change.            No evidence of acute intracranial hemorrhage.  MRI brain -pending  MRA head -pending  MRA neck -pending  TTE - pending      RECOMMENDATIONS:  1.  Continue supportive care.  2.  Continue UTI treatment.  3.  MRI brain pending.  4.  MRA head and neck pending.  5.  Continue pravastatin 20 mg.  6.  Continue atorvastatin 80 mg.  7.  Continue ASA 81 mg.  8.  TTE pending.  9.  Continue neurochecks per protocol.  10.  Continue telemetry monitoring per protocol.    Discussed with patient and sons.  All questions answered.  Case discussed and managed with Dr. Singh.  Will follow.     I personally spent 80 minutes  today, exclusive of procedures, providing care for this patient, including preparation, face to face time, documentation and other services such as review of medical records, diagnostic result, patient education, counseling, coordination of care as specified in the encounter.    JOSE A Parada-ADITI           [1]   Past Medical History:  Diagnosis Date    Abdominal distension (gaseous) 04/09/2018    Abdominal bloating    Abnormal weight loss     Weight loss, non-intentional    Acute upper respiratory infection, unspecified 11/19/2019    URI, acute    Carbuncle, unspecified 09/08/2016    Carbuncle    Disorder of the skin and subcutaneous tissue, unspecified 12/15/2014    Skin lesion of left leg    Menopausal and female climacteric states     Post menopausal syndrome    Other chest pain 11/28/2016    Chest pain, atypical    Overweight     Overweight (BMI 25.0-29.9)    Pain in left ankle and joints of left foot 07/13/2015    Left ankle pain    Personal history of (healed) traumatic fracture     History of fracture of pelvis    Personal history of (healed) traumatic fracture 11/10/2015    History of fracture of pelvis    Personal history of other medical treatment 07/13/2015    History of screening mammography    Unspecified adverse effect of drug or medicament, initial encounter (CODE) 08/25/2015    Medication side effects    Unspecified asthma, uncomplicated ([facility]) 12/15/2017    Asthmatic bronchitis    Unspecified open wound of scalp, sequela 12/17/2019    Open wound of scalp, unspecified open wound type, sequela   [2]   Past Surgical History:  Procedure Laterality Date    CATARACT EXTRACTION  09/08/2016    Cataract Extraction    HYSTERECTOMY  09/08/2016    Hysterectomy    TOTAL KNEE ARTHROPLASTY  09/08/2016    Knee Replacement    TUBAL LIGATION  10/28/2014    Tubal Ligation   [3] amLODIPine, 5 mg, oral, Daily  aspirin, 81 mg, oral, Daily  atorvastatin, 80 mg, oral, Nightly  cefTRIAXone, 1 g, intravenous,  q24h  cholecalciferol, 50 mcg, oral, Daily  cyanocobalamin, 2,500 mcg, oral, Daily  heparin (porcine), 5,000 Units, subcutaneous, q8h  levothyroxine, 50 mcg, oral, Daily  perflutren lipid microspheres, 2 mL of dilution, intravenous, Once in imaging  potassium chloride CR, 10 mEq, oral, Every other day  pravastatin, 20 mg, oral, Daily  torsemide, 10 mg, oral, Every other day    [4] sodium chloride 0.9%, 50 mL/hr, Last Rate: 50 mL/hr (07/27/25 0124)    [5] PRN medications: acetaminophen, labetaloL, melatonin, ondansetron, oxygen, oxygen, polyethylene glycol

## 2025-07-27 NOTE — H&P
History Of Present Illness  Jami Pandey is a 89-year-old female who presents to the ED with generalized weakness.  Patient has a past medical history of basal cell CA, hypothyroidism, osteoporosis, BLE edema, hypertension, and hyperlipidemia.  Patient reports she has been having generalized weakness for some time but feels it is related to her blood pressure medications that she has been on, as she says, quite some time.  She states that she was initially having symptoms of dizziness with her amlodipine so her doctor switched her to hydralazine, and then lisinopril but both of those were worse than the amlodipine so she has been back on amlodipine for a while.  Patient states over the last week though she has became more dizzy and now she has become unsteady and feels very unbalanced.  Patient states she came into the ED today due to feeling so unbalanced.  Patient also reports that she has noticed that she has been slurring her words over the last couple weeks and that has been concerning to her.  She does report that due to the prior dizziness her doctor has had her wear a Holter monitor with no significant events recorded.  Patient does report also having some exertional shortness of breath and previous slight burning with urination when she would pee, she states that that has subsided.  Patient denies any chest pain, nausea, vomiting, diarrhea, syncopal episodes, or new open skin sores.       ED Course      Hemodynamaically Stable.    Vitals: Temp. 36.2, HR 75, Resp.  18, /70, Pulse ox 94% RA     Labs: Glucose 95, Na+ 139, K+ 3.9, Bun 26, Creat.  1.28, GFR 39, Ca 9.1, Albumin 4.4, Alk Phos. 69, ALT 12, AST 17, troponin 5, Mg+ 2.34, WBC 6.5,  Hgb.  12.2, Hct.  38.7, UA: Color colorless, specific gravity 1.004, leuk-esterase 250, WBC 11-20,  Medications: 0.9 normal saline 500 mL bolus,  Imaging: interpreted by radiologist.  CT head: Cerebral atrophy and chronic periventricular white matter small vessel  ischemic change.  No evidence of acute intracranial hemorrhage.  Chest x-ray:   Without acute cardiopulmonary process evident.  EKG: Not available for my review.          Past Medical History  Medical History[1]    Surgical History  Surgical History[2]     Social History  She reports that she has quit smoking. Her smoking use included cigarettes. She has never used smokeless tobacco. She reports current alcohol use. She reports that she does not use drugs.    Family History  Family History[3]     Allergies  Amlodipine-benazepril, Hydralazine, and Enalapril-hydrochlorothiazide    Review of Systems     10 point ROS systems completed and is negative except for what is stated in HPI.     Physical Exam  Constitutional:       General: She is not in acute distress.     Appearance: Normal appearance. She is normal weight. She is not toxic-appearing.   HENT:      Head: Normocephalic and atraumatic.      Nose: Nose normal. No rhinorrhea.      Mouth/Throat:      Mouth: Mucous membranes are dry.     Eyes:      General:         Right eye: No discharge.         Left eye: No discharge.      Conjunctiva/sclera: Conjunctivae normal.      Pupils: Pupils are equal, round, and reactive to light.       Cardiovascular:      Rate and Rhythm: Regular rhythm. Bradycardia present.      Pulses: Normal pulses.   Pulmonary:      Effort: Pulmonary effort is normal. No respiratory distress.      Breath sounds: Examination of the right-lower field reveals decreased breath sounds. Examination of the left-lower field reveals decreased breath sounds. Decreased breath sounds present. No wheezing.   Abdominal:      General: Bowel sounds are normal. There is no distension.      Palpations: Abdomen is soft.      Tenderness: There is no abdominal tenderness. There is no guarding.     Musculoskeletal:         General: Normal range of motion.      Cervical back: Normal range of motion and neck supple.      Right lower le+ Edema present.      Left lower  "le+ Edema present.      Comments: Strength and sensation intact x 4.  No drift x 4     Skin:     General: Skin is warm and dry.     Neurological:      General: No focal deficit present.      Mental Status: She is alert and oriented to person, place, and time. Mental status is at baseline.      Cranial Nerves: No facial asymmetry.      Sensory: Sensation is intact.      Motor: No weakness.     Psychiatric:         Attention and Perception: Attention normal.         Mood and Affect: Mood normal.         Speech: Speech is slurred.         Behavior: Behavior normal. Behavior is cooperative.         Cognition and Memory: Cognition normal.      Comments: Patient does have slightly slurred speech.          Last Recorded Vitals  Blood pressure (!) 185/77, pulse 59, temperature 36.6 °C (97.9 °F), temperature source Temporal, resp. rate 17, height 1.6 m (5' 3\"), weight 68.9 kg (152 lb), SpO2 98%.    Relevant Results  Results for orders placed or performed during the hospital encounter of 25 (from the past 24 hours)   CBC and Auto Differential   Result Value Ref Range    WBC 6.5 4.4 - 11.3 x10*3/uL    nRBC 0.0 0.0 - 0.0 /100 WBCs    RBC 4.17 4.00 - 5.20 x10*6/uL    Hemoglobin 12.2 12.0 - 16.0 g/dL    Hematocrit 38.7 36.0 - 46.0 %    MCV 93 80 - 100 fL    MCH 29.3 26.0 - 34.0 pg    MCHC 31.5 (L) 32.0 - 36.0 g/dL    RDW 13.2 11.5 - 14.5 %    Platelets 253 150 - 450 x10*3/uL    Neutrophils % 58.8 40.0 - 80.0 %    Immature Granulocytes %, Automated 0.3 0.0 - 0.9 %    Lymphocytes % 30.9 13.0 - 44.0 %    Monocytes % 7.9 2.0 - 10.0 %    Eosinophils % 1.2 0.0 - 6.0 %    Basophils % 0.9 0.0 - 2.0 %    Neutrophils Absolute 3.79 1.60 - 5.50 x10*3/uL    Immature Granulocytes Absolute, Automated 0.02 0.00 - 0.50 x10*3/uL    Lymphocytes Absolute 1.99 0.80 - 3.00 x10*3/uL    Monocytes Absolute 0.51 0.05 - 0.80 x10*3/uL    Eosinophils Absolute 0.08 0.00 - 0.40 x10*3/uL    Basophils Absolute 0.06 0.00 - 0.10 x10*3/uL   Magnesium "   Result Value Ref Range    Magnesium 2.34 1.60 - 2.40 mg/dL   Comprehensive metabolic panel   Result Value Ref Range    Glucose 95 74 - 99 mg/dL    Sodium 139 136 - 145 mmol/L    Potassium 3.9 3.5 - 5.3 mmol/L    Chloride 103 98 - 107 mmol/L    Bicarbonate 27 21 - 32 mmol/L    Anion Gap 13 10 - 20 mmol/L    Urea Nitrogen 26 (H) 6 - 23 mg/dL    Creatinine 1.28 (H) 0.50 - 1.05 mg/dL    eGFR 39 (L) >60 mL/min/1.73m*2    Calcium 9.1 8.6 - 10.3 mg/dL    Albumin 4.4 3.4 - 5.0 g/dL    Alkaline Phosphatase 69 33 - 136 U/L    Total Protein 7.6 6.4 - 8.2 g/dL    AST 17 9 - 39 U/L    Bilirubin, Total 0.7 0.0 - 1.2 mg/dL    ALT 12 7 - 45 U/L   Troponin I, High Sensitivity   Result Value Ref Range    Troponin I, High Sensitivity 5 0 - 13 ng/L   Urinalysis with Reflex Culture and Microscopic   Result Value Ref Range    Color, Urine Colorless (N) Light-Yellow, Yellow, Dark-Yellow    Appearance, Urine Clear Clear    Specific Gravity, Urine 1.004 (N) 1.005 - 1.035    pH, Urine 5.5 5.0, 5.5, 6.0, 6.5, 7.0, 7.5, 8.0    Protein, Urine NEGATIVE NEGATIVE, 10 (TRACE), 20 (TRACE) mg/dL    Glucose, Urine Normal Normal mg/dL    Blood, Urine NEGATIVE NEGATIVE mg/dL    Ketones, Urine NEGATIVE NEGATIVE mg/dL    Bilirubin, Urine NEGATIVE NEGATIVE mg/dL    Urobilinogen, Urine Normal Normal mg/dL    Nitrite, Urine NEGATIVE NEGATIVE    Leukocyte Esterase, Urine 250 Lissy/uL (A) NEGATIVE   Microscopic Only, Urine   Result Value Ref Range    WBC, Urine 11-20 (A) 1-5, NONE /HPF    RBC, Urine NONE NONE, 1-2, 3-5 /HPF    Mucus, Urine FEW Reference range not established. /LPF     CT head wo IV contrast  Result Date: 7/26/2025  Interpreted By:  Gurmeet Hua, STUDY: CT HEAD WO IV CONTRAST;  7/26/2025 6:20 pm   INDICATION: Signs/Symptoms:dizzy.   COMPARISON: 5/18/2025   ACCESSION NUMBER(S): [id number]   ORDERING CLINICIAN: GEORGE CRAFT   TECHNIQUE: Contiguous axial images of the head were obtained without intravenous contrast.   FINDINGS: BRAIN  PARENCHYMA:  There is cerebral atrophy and chronic periventricular white matter small vessel ischemic change. The gray white matter differentiation is preserved.  No mass effect or midline shift. Stable mild prominence of CSF space in the left lateral aspect of the posterior fossa which may correspond to an arachnoid cyst.   HEMORRHAGE:  No evidence of acute intracranial hemorrhage. VENTRICLES AND EXTRA-AXIAL SPACES:  The ventricles are within normal limits in size for brain volume.  No evidence of abnormal extraaxial fluid collection. EXTRACRANIAL SOFT TISSUES:  Within normal limits. PARANASAL SINUSES/MASTOIDS:  The visualized paranasal sinuses and mastoid air cells are clear and well pneumatized. CALVARIUM:  No evidence of depressed calvarial fracture.   OTHER FINDINGS:  None       Cerebral atrophy and chronic periventricular white matter small vessel ischemic change.   No evidence of acute intracranial hemorrhage.   MACRO: None   Signed by: Gurmeet Hua 7/26/2025 6:50 PM Dictation workstation:   [id number]    XR chest 1 view  Result Date: 7/26/2025  Interpreted By:  Manny Holguin, STUDY: Chest dated  7/26/2025.   INDICATION: Signs/Symptoms:weak   COMPARISON: Chest dated 05/18/2025.   ACCESSION NUMBER(S): [id number]   ORDERING CLINICIAN: GEORGE CRAFT   TECHNIQUE: One view of the chest.   FINDINGS: The lungs are clear. Density along the right paratracheal stripe region may represent a mediastinal calcified granuloma. No pneumothorax or effusion is evident. The cardiomediastinal silhouette is  not enlarged.The soft tissues are grossly unremarkable.       As above without acute cardiopulmonary process evident.   MACRO: None   Signed by: Manny Holguin 7/26/2025 5:57 PM Dictation workstation:   [id number]      Assessment & Plan  Dehydration    Urinary tract infection without hematuria    HTN (hypertension)    Generalized weakness    Patient arrived to ED today after having generalized weakness for some time  but over the course of the last week she has become unsteady on her feet and has noticed some slurred speech.  Patient initially attributed the generalized weakness with some dizziness to the changes of her blood pressure medications.  Patient became concerned when she started feeling unsteady and having slurred speech.  Initial CT of the head was negative in the ED for stroke.  Will complete stroke workup to rule out any acute findings, neurology consult placed, appreciate recs.  Patient did have a very mild UTI with 11-20 WBC and leuk esterase 250.  Patient did endorse having slight burning with urination approximately a week ago but says it has mostly resolved.  Will go ahead and treat patient for UTI based off her symptoms.  Patient with bilateral lower extremity edema and intermittently hypertensive, cardiology consult placed, appreciate recs.     Patient admitted to Dr. Garcia Alves for further medical management.     # Unsteady  # Slurred speech  # Hypertension  # Dizziness  -As needed EKG  -As needed oxygen  -MRA of the head and neck MRI of the brain  -Echo ordered  -orthostatic BP lying, standing, standing  -As needed analgesics  -As needed antiemetic  - Neurology consult  -Cardiology consult  -PT/OT/SW   -Cardiac diet   -admitted to observation with telemetry    -q4 vitals    -morning labs CBC, BMP, troponin, BNP, lipid, TSH    # Mild urinary tract infection  # Generalized weakness    -Initiate ceftriaxone  -0.9 normal saline at 50 mL/HR  -Culture pending    Chronic Conditions   # Osteoporosis  # BLE edema  # HLD  # HTN  # Basal cell CA  # Hypothyroidism     Continue home medications as ordered.         DNR with intubation, with ICU, DNR form signed and placed with chart on floor.      #DVT Prophylaxis   Scd's as tolerated   DVT Prophylaxis Heparin      Thorough record review performed of previous labs and notes from prior encounters.            JOSE A Ryan-CNP         [1]   Past Medical  History:  Diagnosis Date    Abdominal distension (gaseous) 04/09/2018    Abdominal bloating    Abnormal weight loss     Weight loss, non-intentional    Acute upper respiratory infection, unspecified 11/19/2019    URI, acute    Carbuncle, unspecified 09/08/2016    Carbuncle    Disorder of the skin and subcutaneous tissue, unspecified 12/15/2014    Skin lesion of left leg    Menopausal and female climacteric states     Post menopausal syndrome    Other chest pain 11/28/2016    Chest pain, atypical    Overweight     Overweight (BMI 25.0-29.9)    Pain in left ankle and joints of left foot 07/13/2015    Left ankle pain    Personal history of (healed) traumatic fracture     History of fracture of pelvis    Personal history of (healed) traumatic fracture 11/10/2015    History of fracture of pelvis    Personal history of other medical treatment 07/13/2015    History of screening mammography    Unspecified adverse effect of drug or medicament, initial encounter (CODE) 08/25/2015    Medication side effects    Unspecified asthma, uncomplicated ([facility]) 12/15/2017    Asthmatic bronchitis    Unspecified open wound of scalp, sequela 12/17/2019    Open wound of scalp, unspecified open wound type, sequela   [2]   Past Surgical History:  Procedure Laterality Date    CATARACT EXTRACTION  09/08/2016    Cataract Extraction    HYSTERECTOMY  09/08/2016    Hysterectomy    TOTAL KNEE ARTHROPLASTY  09/08/2016    Knee Replacement    TUBAL LIGATION  10/28/2014    Tubal Ligation   [3]   Family History  Problem Relation Name Age of Onset    Hypertension Mother      Cancer Father

## 2025-07-27 NOTE — PROGRESS NOTES
07/27/25 1144   Discharge Planning   Living Arrangements Alone   Support Systems Children;Family members   Type of Residence Private residence   Number of Stairs to Enter Residence 2   Number of Stairs Within Residence 26   Expected Discharge Disposition Home H   Does the patient need discharge transport arranged? No   Intensity of Service   Intensity of Service >30 min     I met with patient at her bedside, verified insurance and demographics.  Patient lives alone.  She ambulates with a cane when out of the home.  She drives and is independent with ADLs, has a large family support system including 5 sons and many grandchildren and great grandchildren.  Patient is agreeable to home health care, asked for the home health care agency that would make it most accommodating for her PCP.  Care Coordination team to follow up with attending/PCP.  Rolf [facility]

## 2025-07-27 NOTE — CONSULTS
Cardiology Consult Note    Inpatient consult to Cardiology  Consult performed by: Smith Swift MD  Consult ordered by: JOSE A Ryan-CNP         Jami Pandey is a 89 y.o. female on day 0 of admission presenting with Dehydration.      Subjective   This is a very pleasant 89-year-old female who still lives by herself.  She comes in with generalized weakness and episodes of dizziness which occur anytime she has a change in medications.  She has a history of hypertension and hyperlipidemia.  She denies chest pain or pressure EKG denies shortness of breath.  She has had some burning when she urinates.  She was initially on amlodipine but developed lower extremity edema so this was stopped and she was switched to hydralazine and lisinopril both of which she felt worse with.  She therefore went back on the amlodipine and seems to be tolerating it just fine.  She does believe her balance is off and she is just very weak.     ROS:  10 systems reviewed other than what is mentioned above.     Past Medical History:  Medical History[1]    Problem List Items Addressed This Visit       Edema of both lower extremities    Relevant Orders    Transthoracic echo (TTE) complete    Generalized weakness - Primary    Relevant Orders    Transthoracic echo (TTE) complete     Other Visit Diagnoses         Unsteady gait                Family History:  No relevant family history has been documented for this patient.    Medications:  Prior to Admission medications   Medication Sig Start Date End Date Taking? Authorizing Provider   amLODIPine (Norvasc) 5 mg tablet Take 1 tablet by mouth once daily 1/31/25  Yes Garcia Alves, DO   aspirin 81 mg EC tablet Take 1 tablet (81 mg) by mouth once daily.   Yes Historical Provider, MD   cholecalciferol (Vitamin D-3) 50 mcg (2,000 units) tablet Take 1 tablet (50 mcg) by mouth once daily.   Yes Historical Provider, MD   cyanocobalamin, vitamin B-12, 3,000 mcg capsule Take 1 capsule by mouth once  daily.   Yes Historical Provider, MD   levothyroxine (Synthroid, Levoxyl) 50 mcg tablet Take 1 tablet (50 mcg) by mouth once daily. 12/11/24  Yes Garcia Alves, DO   levothyroxine (Synthroid, Levoxyl) 50 mcg tablet Take 1 tablet (50 mcg) by mouth once daily. 12/11/24  Yes Garcia Alves DO   potassium chloride CR 10 mEq ER tablet TAKE 1 TABLET BY MOUTH ONCE DAILY AS DIRECTED 1/31/25  Yes Garcia Alves DO   pravastatin (Pravachol) 20 mg tablet Take 1 tablet (20 mg) by mouth once daily. as directed 7/23/24  Yes Garcia Alves DO   torsemide (Demadex) 10 mg tablet Take 1 tablet (10 mg) by mouth once daily. 12/11/24  Yes Garcia Alves DO   vit C,E-Ko-dczvh-lutein-zeaxan (PreserVision AREDS-2) 250-90-40-1 mg capsule Take 1 capsule by mouth 2 times a day.   Yes Historical Provider, MD   ofloxacin (Floxin) 0.3 % otic solution Administer 4 drops into the left ear, twice daily, for the next 7 days  Patient not taking: Reported on 7/26/2025 4/9/25   Thuy Lind, APRN-CNP   ergocalciferol, vitamin D2, 50 mcg (2,000 unit) tablet Take by mouth. 12/6/19 7/26/25  Historical Provider, MD   vit A/vit C/vit E/zinc/copper (PRESERVISION AREDS ORAL) Take by mouth.  7/26/25  Historical Provider, MD     Current Medications[2]    Allergies:  Allergies[3]    Social History:  Social History     Tobacco Use    Smoking status: Former     Types: Cigarettes    Smokeless tobacco: Never   Substance Use Topics    Alcohol use: Yes     Alcohol/week: 0.0 - 1.0 standard drinks of alcohol     Comment: social       Physical Exam:  Last Recorded Vitals  /74 (BP Location: Right arm, Patient Position: Lying)   Pulse 60   Temp 35.7 °C (96.3 °F) (Temporal)   Resp 17   Wt 68.9 kg (152 lb)   SpO2 96%   Intake/Output last 3 Shifts:    Intake/Output Summary (Last 24 hours) at 7/27/2025 1028  Last data filed at 7/27/2025 0215  Gross per 24 hour   Intake 550 ml   Output 600 ml   Net -50 ml       Admission  Weight  Weight: 68.9 kg (152 lb) (07/26/25 1401)    Daily Weight  07/26/25 : 68.9 kg (152 lb)      Patient is alert and oriented x3.  She looks younger than stated age  HEENT is unremarkable mucous members are moist  Neck no JVP no bruits upstrokes are full no thyromegaly  Lungs are clear bilaterally.  No wheezing crackles or rales  Heart regular rhythm normal S1-S2 there is no S3 no murmurs are heard.  Abdomen is soft bowel sounds are positive nontender nondistended no organomegaly no pulsatile masses  Extremities have trace edema.  Distal pulses present palpable.  Neuro is grossly nonfocal  Skin has no rashes     Image Results  CT head wo IV contrast  Narrative: Interpreted By:  Gurmeet Hua,   STUDY:  CT HEAD WO IV CONTRAST;  7/26/2025 6:20 pm      INDICATION:  Signs/Symptoms:dizzy.      COMPARISON:  5/18/2025      ACCESSION NUMBER(S):  [id number]      ORDERING CLINICIAN:  GEORGE CRAFT      TECHNIQUE:  Contiguous axial images of the head were obtained without intravenous  contrast.      FINDINGS:  BRAIN PARENCHYMA:  There is cerebral atrophy and chronic  periventricular white matter small vessel ischemic change. The gray  white matter differentiation is preserved.  No mass effect or midline  shift. Stable mild prominence of CSF space in the left lateral aspect  of the posterior fossa which may correspond to an arachnoid cyst.      HEMORRHAGE:  No evidence of acute intracranial hemorrhage.  VENTRICLES AND EXTRA-AXIAL SPACES:  The ventricles are within normal  limits in size for brain volume.  No evidence of abnormal extraaxial  fluid collection. EXTRACRANIAL SOFT TISSUES:  Within normal limits.  PARANASAL SINUSES/MASTOIDS:  The visualized paranasal sinuses and  mastoid air cells are clear and well pneumatized. CALVARIUM:  No  evidence of depressed calvarial fracture.      OTHER FINDINGS:  None      Impression: Cerebral atrophy and chronic periventricular white matter small  vessel ischemic change.      No  evidence of acute intracranial hemorrhage.      MACRO:  None      Signed by: Gurmeet Hua 7/26/2025 6:50 PM  Dictation workstation:   [id number]  XR chest 1 view  Narrative: Interpreted By:  Manny Holguin,   STUDY:  Chest dated  7/26/2025.      INDICATION:  Signs/Symptoms:weak      COMPARISON:  Chest dated 05/18/2025.      ACCESSION NUMBER(S):  [id number]      ORDERING CLINICIAN:  GEORGE CRAFT      TECHNIQUE:  One view of the chest.      FINDINGS:  The lungs are clear. Density along the right paratracheal stripe  region may represent a mediastinal calcified granuloma. No  pneumothorax or effusion is evident. The cardiomediastinal silhouette  is  not enlarged.The soft tissues are grossly unremarkable.      Impression: As above without acute cardiopulmonary process evident.      MACRO:  None      Signed by: Manny Holguin 7/26/2025 5:57 PM  Dictation workstation:   [id number]    Relevant Results:  Results for orders placed or performed during the hospital encounter of 07/26/25 (from the past 24 hours)   CBC and Auto Differential   Result Value Ref Range    WBC 6.5 4.4 - 11.3 x10*3/uL    nRBC 0.0 0.0 - 0.0 /100 WBCs    RBC 4.17 4.00 - 5.20 x10*6/uL    Hemoglobin 12.2 12.0 - 16.0 g/dL    Hematocrit 38.7 36.0 - 46.0 %    MCV 93 80 - 100 fL    MCH 29.3 26.0 - 34.0 pg    MCHC 31.5 (L) 32.0 - 36.0 g/dL    RDW 13.2 11.5 - 14.5 %    Platelets 253 150 - 450 x10*3/uL    Neutrophils % 58.8 40.0 - 80.0 %    Immature Granulocytes %, Automated 0.3 0.0 - 0.9 %    Lymphocytes % 30.9 13.0 - 44.0 %    Monocytes % 7.9 2.0 - 10.0 %    Eosinophils % 1.2 0.0 - 6.0 %    Basophils % 0.9 0.0 - 2.0 %    Neutrophils Absolute 3.79 1.60 - 5.50 x10*3/uL    Immature Granulocytes Absolute, Automated 0.02 0.00 - 0.50 x10*3/uL    Lymphocytes Absolute 1.99 0.80 - 3.00 x10*3/uL    Monocytes Absolute 0.51 0.05 - 0.80 x10*3/uL    Eosinophils Absolute 0.08 0.00 - 0.40 x10*3/uL    Basophils Absolute 0.06 0.00 - 0.10 x10*3/uL   Magnesium   Result  Value Ref Range    Magnesium 2.34 1.60 - 2.40 mg/dL   Comprehensive metabolic panel   Result Value Ref Range    Glucose 95 74 - 99 mg/dL    Sodium 139 136 - 145 mmol/L    Potassium 3.9 3.5 - 5.3 mmol/L    Chloride 103 98 - 107 mmol/L    Bicarbonate 27 21 - 32 mmol/L    Anion Gap 13 10 - 20 mmol/L    Urea Nitrogen 26 (H) 6 - 23 mg/dL    Creatinine 1.28 (H) 0.50 - 1.05 mg/dL    eGFR 39 (L) >60 mL/min/1.73m*2    Calcium 9.1 8.6 - 10.3 mg/dL    Albumin 4.4 3.4 - 5.0 g/dL    Alkaline Phosphatase 69 33 - 136 U/L    Total Protein 7.6 6.4 - 8.2 g/dL    AST 17 9 - 39 U/L    Bilirubin, Total 0.7 0.0 - 1.2 mg/dL    ALT 12 7 - 45 U/L   Troponin I, High Sensitivity   Result Value Ref Range    Troponin I, High Sensitivity 5 0 - 13 ng/L   Urinalysis with Reflex Culture and Microscopic   Result Value Ref Range    Color, Urine Colorless (N) Light-Yellow, Yellow, Dark-Yellow    Appearance, Urine Clear Clear    Specific Gravity, Urine 1.004 (N) 1.005 - 1.035    pH, Urine 5.5 5.0, 5.5, 6.0, 6.5, 7.0, 7.5, 8.0    Protein, Urine NEGATIVE NEGATIVE, 10 (TRACE), 20 (TRACE) mg/dL    Glucose, Urine Normal Normal mg/dL    Blood, Urine NEGATIVE NEGATIVE mg/dL    Ketones, Urine NEGATIVE NEGATIVE mg/dL    Bilirubin, Urine NEGATIVE NEGATIVE mg/dL    Urobilinogen, Urine Normal Normal mg/dL    Nitrite, Urine NEGATIVE NEGATIVE    Leukocyte Esterase, Urine 250 Lissy/uL (A) NEGATIVE   Microscopic Only, Urine   Result Value Ref Range    WBC, Urine 11-20 (A) 1-5, NONE /HPF    RBC, Urine NONE NONE, 1-2, 3-5 /HPF    Mucus, Urine FEW Reference range not established. /LPF   CBC   Result Value Ref Range    WBC 6.1 4.4 - 11.3 x10*3/uL    nRBC 0.0 0.0 - 0.0 /100 WBCs    RBC 4.21 4.00 - 5.20 x10*6/uL    Hemoglobin 12.1 12.0 - 16.0 g/dL    Hematocrit 39.1 36.0 - 46.0 %    MCV 93 80 - 100 fL    MCH 28.7 26.0 - 34.0 pg    MCHC 30.9 (L) 32.0 - 36.0 g/dL    RDW 13.2 11.5 - 14.5 %    Platelets 269 150 - 450 x10*3/uL   Coagulation Screen   Result Value Ref Range     Protime 10.4 9.8 - 12.4 seconds    INR 0.9 0.9 - 1.1    aPTT 28 26 - 36 seconds   Basic Metabolic Panel   Result Value Ref Range    Glucose 92 74 - 99 mg/dL    Sodium 143 136 - 145 mmol/L    Potassium 3.7 3.5 - 5.3 mmol/L    Chloride 105 98 - 107 mmol/L    Bicarbonate 28 21 - 32 mmol/L    Anion Gap 14 10 - 20 mmol/L    Urea Nitrogen 19 6 - 23 mg/dL    Creatinine 1.11 (H) 0.50 - 1.05 mg/dL    eGFR 47 (L) >60 mL/min/1.73m*2    Calcium 9.0 8.6 - 10.3 mg/dL   B-Type Natriuretic Peptide   Result Value Ref Range     (H) 0 - 99 pg/mL   TSH with reflex to Free T4 if abnormal   Result Value Ref Range    Thyroid Stimulating Hormone 1.49 0.44 - 3.98 mIU/L   Troponin I, High Sensitivity, Initial   Result Value Ref Range    Troponin I, High Sensitivity 10 0 - 13 ng/L   Troponin, High Sensitivity, 1 Hour   Result Value Ref Range    Troponin I, High Sensitivity 9 0 - 13 ng/L   POCT GLUCOSE   Result Value Ref Range    POCT Glucose 103 (H) 74 - 99 mg/dL     Results from last 7 days   Lab Units 07/27/25  0556 07/26/25  1748   PROTEIN TOTAL g/dL  --  7.6   BILIRUBIN TOTAL mg/dL  --  0.7   ALK PHOS U/L  --  69   ALT U/L  --  12   AST U/L  --  17   GLUCOSE mg/dL 92 95   TSH mIU/L 1.49  --    BNP pg/mL 127*  --        Assessment/Plan   7/27/2025  1.  Hypertension.  Certainly a more permissive approach is reasonable at this age given her symptomatic dizziness.  However I cannot correlate dizziness with drops in blood pressure.  She is doing okay with the amlodipine and the low-dose of torsemide which will be continued.  I do not have an explanation for her weakness.  She is being evaluated for possible urinary tract infection.  Her EKG is pending at this time.  Her prior EKG from May 2025 appeared as though she had a prior anteroseptal and possible inferior infarct.  2.  Unsteady gait.  This is less likely to be orthostatic nor cardiac.  An echo has been ordered and is pending.  Possibly worsened by urinary tract  infection.    Thank you for this consult.  We will follow along at a distance.    Smith Swift MD           [1]   Past Medical History:  Diagnosis Date    Abdominal distension (gaseous) 04/09/2018    Abdominal bloating    Abnormal weight loss     Weight loss, non-intentional    Acute upper respiratory infection, unspecified 11/19/2019    URI, acute    Carbuncle, unspecified 09/08/2016    Carbuncle    Disorder of the skin and subcutaneous tissue, unspecified 12/15/2014    Skin lesion of left leg    Menopausal and female climacteric states     Post menopausal syndrome    Other chest pain 11/28/2016    Chest pain, atypical    Overweight     Overweight (BMI 25.0-29.9)    Pain in left ankle and joints of left foot 07/13/2015    Left ankle pain    Personal history of (healed) traumatic fracture     History of fracture of pelvis    Personal history of (healed) traumatic fracture 11/10/2015    History of fracture of pelvis    Personal history of other medical treatment 07/13/2015    History of screening mammography    Unspecified adverse effect of drug or medicament, initial encounter (CODE) 08/25/2015    Medication side effects    Unspecified asthma, uncomplicated ([facility]) 12/15/2017    Asthmatic bronchitis    Unspecified open wound of scalp, sequela 12/17/2019    Open wound of scalp, unspecified open wound type, sequela   [2]   Current Facility-Administered Medications   Medication Dose Route Frequency Provider Last Rate Last Admin    acetaminophen (Tylenol) tablet 650 mg  650 mg oral q4h PRN AB Ryan        amLODIPine (Norvasc) tablet 5 mg  5 mg oral Daily AB Ryan   5 mg at 07/27/25 0816    aspirin EC tablet 81 mg  81 mg oral Daily AB Ryan   81 mg at 07/27/25 0817    atorvastatin (Lipitor) tablet 80 mg  80 mg oral Nightly AB Ryan        cefTRIAXone (Rocephin) 1 g in dextrose (iso) IV 50 mL  1 g intravenous q24h AB Ryan   Stopped at 07/27/25  0215    cholecalciferol (Vitamin D-3) tablet 50 mcg  50 mcg oral Daily AB Ryan        cyanocobalamin (Vitamin B-12) tablet 2,500 mcg  2,500 mcg oral Daily AB Ryan        heparin (porcine) injection 5,000 Units  5,000 Units subcutaneous q8h AB Ryan   5,000 Units at 07/27/25 0630    labetaloL (Normodyne,Trandate) injection 10 mg  10 mg intravenous q10 min PRN AB Ryan        levothyroxine (Synthroid, Levoxyl) tablet 50 mcg  50 mcg oral Daily Sandra Gallego PA-C   50 mcg at 07/27/25 0936    melatonin tablet 5 mg  5 mg oral Nightly PRN AB Ryan   5 mg at 07/27/25 0124    ondansetron (Zofran) injection 4 mg  4 mg intravenous q6h PRN AB Ryan        oxygen (O2) therapy  1 Dose inhalation PRN AB Ryan        oxygen (O2) therapy  1 Dose inhalation Continuous - O2/gases AB Ryan        perflutren lipid microspheres (Definity) injection 2 mL of dilution  2 mL of dilution intravenous Once in imaging AB Ryan        polyethylene glycol (Glycolax, Miralax) packet 17 g  17 g oral Daily PRN AB Ryan        potassium chloride CR (Klor-Con) ER tablet 10 mEq  10 mEq oral Every other day AB Ryan   10 mEq at 07/27/25 0817    pravastatin (Pravachol) tablet 20 mg  20 mg oral Daily AB Ryan   20 mg at 07/27/25 0817    sodium chloride 0.9% infusion  50 mL/hr intravenous Continuous AB Ryan 50 mL/hr at 07/27/25 0124 50 mL/hr at 07/27/25 0124    torsemide (Demadex) tablet 10 mg  10 mg oral Every other day AB Ryan   10 mg at 07/27/25 0817   [3]   Allergies  Allergen Reactions    Amlodipine-Benazepril Palpitations and Dizziness    Hydralazine Palpitations and Dizziness    Enalapril-Hydrochlorothiazide Unknown

## 2025-07-27 NOTE — PROGRESS NOTES
Occupational Therapy    Evaluation    Patient Name: Jami Pandey  MRN: [id number]  Department: [facility]  Room: [address] [place] [address]  Today's Date: 7/27/2025  Time Calculation  Start Time: 1047  Stop Time: 1109  Time Calculation (min): 22 min    Assessment  IP OT Assessment  OT Assessment: Pt demonstrates a decline in adl/functional mobility. Recommend  low  intensity OT tx intervention 2x/week. Good prognosis for goal attainment  Barriers to Discharge Home: No anticipated barriers  End of Session Communication: Bedside nurse  End of Session Patient Position: Bed, 3 rail up, Alarm on  Plan:  Treatment Interventions: ADL retraining, Functional transfer training  OT Frequency: 2 times per week  OT Discharge Recommendations: Low intensity level of continued care (Recommend  [facility] OT tx intervention post hospitalization)  OT - OK to Discharge: Yes (once medically cleared)    Subjective   Current Problem:  1. Generalized weakness  Transthoracic echo (TTE) complete    Transthoracic echo (TTE) complete      2. Unsteady gait        3. Edema of both lower extremities  Transthoracic echo (TTE) complete    Transthoracic echo (TTE) complete        OT Visit Info:  OT Received On: 07/27/25  General Visit Info:  General  Reason for Referral: OT eval/tx/ impaired functional daily living skills  Referred By: Dr Blakely  Past Medical History Relevant to Rehab: HTN, HLD, OA  Co-Treatment: PT  Co-Treatment Reason: maximize pt safety  Prior to Session Communication: Bedside nurse  Patient Position Received: Bed, 3 rail up, Alarm on  General Comment: Pt agreeable to OT tx intervention  Precautions:  Precautions Comment: fall, IV,           Pain:  Pain Assessment  Pain Assessment: 0-10  0-10 (Numeric) Pain Score: 0 - No pain  Pain Interventions: Repositioned    Objective   Cognition:  Overall Cognitive Status: Within Functional Limits  Orientation Level: Oriented X4           Home Living:  Home Living Comments: Pt lives alone in 2 story home. 2STE, 13 /c  rail to 2nd floor. . Pt has a 1/2 bath 1st floor with an elevated toliet seat with rails. 2nd floor -pt has a walk in shower without a shower chair,has rails,HHS. Pt ha s an elevated toliet with rails .Pt has 5 sons, local/supportive.Laundry is in the basement . Pt continues to complete home mgnt including laundry..Pt uses a wheeled walker and has an adjsutable bed without rails.   Prior Function:  Prior Function Comments: Pt indep /c ADL/IADLs. Use of cane in the community, also has access to WW. Denies falls. Pt drives. Sleeps in an adjustable bed.  IADL History:   Pta indep with home mgnt  ADL:   PTA indep without adl equip  Activity Tolerance:  Endurance: Tolerates 10 - 20 min exercise with multiple rests  Bed Mobility/Transfers: Bed Mobility  Bed Mobility: Yes  Bed Mobility 1  Bed Mobility Comments 1: Supine<>Sit /c SBA, UE support    Transfers  Transfer: Yes  Transfer 1  Trials/Comments 1: Sit<>Stand /c minAx1 d/t initial retropulsive lean.      Functional Mobility:  Functional Mobility  Functional Mobility Performed:  (functional mobility cga   with a  wheeled walker bathroom distances)  Sitting Balance:   wfl  Standing Balance:   wfl       IADL's: PTA pt indep     Vision: Vision - Basic Assessment  Current Vision: No visual deficits  Sensation:  Light Touch: No apparent deficits  Sensation Comment: chronic n/t R hand and miri feet  Strength:  Strength Comments: strength below elbow wfl  Perception:   wfl  Coordination:    wfl  Hand Function:  Hand Function  Gross Grasp: Functional  Extremities: RUE   RUE : Within Functional Limits and LUE   LUE: Within Functional Limits    Outcome Measures: [facility] Daily Activity  Putting on and taking off regular lower body clothing: A little  Bathing (including washing, rinsing, drying): A little  Putting on and taking off regular upper body clothing: A little  Toileting, which includes using toilet, bedpan or urinal: A little  Taking care of personal grooming such as  brushing teeth: A little  Eating Meals: None  Daily Activity - Total Score: 19      Education Documentation  Body Mechanics, taught by Yi Gordon OT at 7/27/2025  3:38 PM.  Learner: Patient  Readiness: Acceptance  Method: Demonstration  Response: Demonstrated Understanding, Needs Reinforcement    Precautions, taught by Yi Gordon OT at 7/27/2025  3:38 PM.  Learner: Patient  Readiness: Acceptance  Method: Demonstration  Response: Demonstrated Understanding, Needs Reinforcement    ADL Training, taught by Yi Gordon OT at 7/27/2025  3:38 PM.  Learner: Patient  Readiness: Acceptance  Method: Demonstration  Response: Demonstrated Understanding, Needs Reinforcement    Mobility Training, taught by Yi Gordon OT at 7/27/2025  3:38 PM.  Learner: Patient  Readiness: Acceptance  Method: Demonstration  Response: Demonstrated Understanding, Needs Reinforcement    Body Mechanics, taught by Yi Gordon OT at 7/27/2025  3:37 PM.  Learner: Patient  Readiness: Acceptance  Method: Demonstration  Response: Demonstrated Understanding, Needs Reinforcement    Precautions, taught by Yi Gordon OT at 7/27/2025  3:37 PM.  Learner: Patient  Readiness: Acceptance  Method: Demonstration  Response: Demonstrated Understanding, Needs Reinforcement    ADL Training, taught by Yi Gordon OT at 7/27/2025  3:37 PM.  Learner: Patient  Readiness: Acceptance  Method: Demonstration  Response: Demonstrated Understanding, Needs Reinforcement    Mobility Training, taught by Yi Gordon OT at 7/27/2025  3:37 PM.  Learner: Patient  Readiness: Acceptance  Method: Demonstration  Response: Demonstrated Understanding, Needs Reinforcement    Education Comments  No comments found.      Goals:   Encounter Problems       Encounter Problems (Active)       impaired functional daily living skills       Pt will increase Grooming to s/mod indep   Upper Body Bathing to s/mod indep    Lower Body Bathing  to s/mod  indep    Increase Upper Body Dressing  to s/mod indep     LE Dressing to /mod indep with/ without adaptive equipment as needed    (Progressing)   Start:  07/27/25    Expected End:  08/10/25            Pt will increase Functional Transfers Bed Mobility to s/mod indep    Sit to Stand  to s/mod indep    Functional Mobility  with a device to s/mod indep  chair/toliet/room to increase indep/safety in patients discharge environment    (Progressing)   Start:  07/27/25    Expected End:  08/10/25

## 2025-07-27 NOTE — ED NOTES
Report called to DEVORAH Lerma on 6th floor. All questions answered per same.      Tracie Bailey RN  07/27/25 0001

## 2025-07-28 ENCOUNTER — APPOINTMENT (OUTPATIENT)
Dept: RADIOLOGY | Facility: HOSPITAL | Age: OVER 89
DRG: 641 | End: 2025-07-28
Payer: MEDICARE

## 2025-07-28 ENCOUNTER — PHARMACY VISIT (OUTPATIENT)
Dept: PHARMACY | Facility: CLINIC | Age: OVER 89
End: 2025-07-28
Payer: MEDICARE

## 2025-07-28 VITALS
DIASTOLIC BLOOD PRESSURE: 70 MMHG | WEIGHT: 152 LBS | TEMPERATURE: 97.2 F | HEIGHT: 63 IN | RESPIRATION RATE: 18 BRPM | SYSTOLIC BLOOD PRESSURE: 144 MMHG | HEART RATE: 70 BPM | BODY MASS INDEX: 26.93 KG/M2 | OXYGEN SATURATION: 96 %

## 2025-07-28 LAB
BACTERIA UR CULT: NO GROWTH
GLUCOSE BLD MANUAL STRIP-MCNC: 101 MG/DL (ref 74–99)
GLUCOSE BLD MANUAL STRIP-MCNC: 121 MG/DL (ref 74–99)
GLUCOSE BLD MANUAL STRIP-MCNC: 99 MG/DL (ref 74–99)
HOLD SPECIMEN: NORMAL

## 2025-07-28 PROCEDURE — 70547 MR ANGIOGRAPHY NECK W/O DYE: CPT | Performed by: RADIOLOGY

## 2025-07-28 PROCEDURE — 2500000001 HC RX 250 WO HCPCS SELF ADMINISTERED DRUGS (ALT 637 FOR MEDICARE OP)

## 2025-07-28 PROCEDURE — 70544 MR ANGIOGRAPHY HEAD W/O DYE: CPT

## 2025-07-28 PROCEDURE — 2500000001 HC RX 250 WO HCPCS SELF ADMINISTERED DRUGS (ALT 637 FOR MEDICARE OP): Performed by: INTERNAL MEDICINE

## 2025-07-28 PROCEDURE — 70547 MR ANGIOGRAPHY NECK W/O DYE: CPT

## 2025-07-28 PROCEDURE — 1200000002 HC GENERAL ROOM WITH TELEMETRY DAILY

## 2025-07-28 PROCEDURE — 70551 MRI BRAIN STEM W/O DYE: CPT | Performed by: RADIOLOGY

## 2025-07-28 PROCEDURE — 70544 MR ANGIOGRAPHY HEAD W/O DYE: CPT | Performed by: RADIOLOGY

## 2025-07-28 PROCEDURE — 70551 MRI BRAIN STEM W/O DYE: CPT

## 2025-07-28 PROCEDURE — 2500000002 HC RX 250 W HCPCS SELF ADMINISTERED DRUGS (ALT 637 FOR MEDICARE OP, ALT 636 FOR OP/ED): Performed by: PHYSICIAN ASSISTANT

## 2025-07-28 PROCEDURE — 99233 SBSQ HOSP IP/OBS HIGH 50: CPT | Performed by: PSYCHIATRY & NEUROLOGY

## 2025-07-28 PROCEDURE — RXMED WILLOW AMBULATORY MEDICATION CHARGE

## 2025-07-28 PROCEDURE — 2500000004 HC RX 250 GENERAL PHARMACY W/ HCPCS (ALT 636 FOR OP/ED)

## 2025-07-28 PROCEDURE — 82947 ASSAY GLUCOSE BLOOD QUANT: CPT

## 2025-07-28 PROCEDURE — 99233 SBSQ HOSP IP/OBS HIGH 50: CPT | Performed by: INTERNAL MEDICINE

## 2025-07-28 RX ORDER — CHLORTHALIDONE 25 MG/1
25 TABLET ORAL DAILY
Status: DISCONTINUED | OUTPATIENT
Start: 2025-07-28 | End: 2025-07-28 | Stop reason: HOSPADM

## 2025-07-28 RX ORDER — CHLORTHALIDONE 25 MG/1
25 TABLET ORAL DAILY
Qty: 90 TABLET | Refills: 3 | Status: SHIPPED | OUTPATIENT
Start: 2025-07-29

## 2025-07-28 RX ADMIN — LEVOTHYROXINE SODIUM 50 MCG: 0.05 TABLET ORAL at 07:45

## 2025-07-28 RX ADMIN — CYANOCOBALAMIN TAB 1000 MCG 2500 MCG: 1000 TAB at 09:23

## 2025-07-28 RX ADMIN — Medication 50 MCG: at 09:24

## 2025-07-28 RX ADMIN — ASPIRIN 81 MG: 81 TABLET, COATED ORAL at 09:23

## 2025-07-28 RX ADMIN — AMLODIPINE BESYLATE 5 MG: 5 TABLET ORAL at 09:24

## 2025-07-28 RX ADMIN — CHLORTHALIDONE 25 MG: 25 TABLET ORAL at 11:24

## 2025-07-28 RX ADMIN — HEPARIN SODIUM 5000 UNITS: 5000 INJECTION, SOLUTION INTRAVENOUS; SUBCUTANEOUS at 09:27

## 2025-07-28 RX ADMIN — CEFTRIAXONE 1 G: 1 INJECTION, SOLUTION INTRAVENOUS at 00:46

## 2025-07-28 RX ADMIN — PRAVASTATIN SODIUM 20 MG: 20 TABLET ORAL at 09:23

## 2025-07-28 RX ADMIN — HEPARIN SODIUM 5000 UNITS: 5000 INJECTION, SOLUTION INTRAVENOUS; SUBCUTANEOUS at 16:31

## 2025-07-28 ASSESSMENT — PAIN SCALES - GENERAL: PAINLEVEL_OUTOF10: 0 - NO PAIN

## 2025-07-28 NOTE — PROGRESS NOTES
"Jami Pandey is a 89 y.o. female on day 0 of admission presenting with Dehydration.      Subjective   Seen at bedside. Reports dizziness/speech change has resolved.  States she has mild dizziness at baseline with age.        Objective     Last Recorded Vitals  Blood pressure 144/70, pulse 70, temperature 36.2 °C (97.2 °F), resp. rate 18, height 1.6 m (5' 3\"), weight 68.9 kg (152 lb), SpO2 96%.    Physical Exam  Neurological Exam    Standing at bedside, walking without difficulty, no focal abnormalities.     Relevant Results    Results for orders placed or performed during the hospital encounter of 07/26/25 (from the past 24 hours)   POCT GLUCOSE   Result Value Ref Range    POCT Glucose 142 (H) 74 - 99 mg/dL   POCT GLUCOSE   Result Value Ref Range    POCT Glucose 101 (H) 74 - 99 mg/dL   POCT GLUCOSE   Result Value Ref Range    POCT Glucose 121 (H) 74 - 99 mg/dL   POCT GLUCOSE   Result Value Ref Range    POCT Glucose 99 74 - 99 mg/dL          NIH Stroke Scale  1A. Level of Consciousness: Alert, Keenly Responsive  1B. Ask Month and Age: Both Questions Right  1C. Blink Eyes & Squeeze Hands: Performs Both Tasks  2. Best Gaze: Normal  3. Visual: No Visual Loss  4. Facial Palsy: Normal Symmetrical Movements  5A. Motor - Left Arm: No Drift  5B. Motor - Right Arm: No Drift  6A. Motor - Left Leg: No Drift  6B. Motor - Right Leg: No Drift  7. Limb Ataxia: Absent  8. Sensory Loss: Normal  9. Best Language: No Aphasia  10. Dysarthria: Normal  11. Extinction and Inattention: No Abnormality  NIH Stroke Scale: 0           Napoleon Coma Scale  Best Eye Response: Spontaneous  Best Verbal Response: Oriented  Best Motor Response: Follows commands  Laura Coma Scale Score: 15                                Assessment & Plan  Dehydration    HTN (hypertension)    Urinary tract infection without hematuria    Generalized weakness    Slurred speech    Ms. Pandey is a 89 year old woman whom presented with slurred speech and off balance.  " MRI brain negative for acute abnormality. Found to have UTI, likely cause of symptoms.  Also noted to be hypertensive which could have contributed.  Low suspicion for vascular etiology.  Continue home pravastatin and aspirin.  No further neurologic work up needed. Will sign off.       I spent 50 minutes in the professional and overall care of this patient.      Rosa Waite, DO

## 2025-07-28 NOTE — H&P
History Of Present Illness  Jami Pandey is a 89-year-old female who presents to the ED with generalized weakness.  Patient has a past medical history of basal cell CA, hypothyroidism, osteoporosis, BLE edema, hypertension, and hyperlipidemia.  Patient reports she has been having generalized weakness for some time but feels it is related to her blood pressure medications that she has been on, as she says, quite some time.  She states that she was initially having symptoms of dizziness with her amlodipine so her doctor switched her to hydralazine, and then lisinopril but both of those were worse than the amlodipine so she has been back on amlodipine for a while.  Patient states over the last week though she has became more dizzy and now she has become unsteady and feels very unbalanced.  Patient states she came into the ED today due to feeling so unbalanced.  Patient also reports that she has noticed that she has been slurring her words over the last couple weeks and that has been concerning to her.  She does report that due to the prior dizziness her doctor has had her wear a Holter monitor with no significant events recorded.  Patient does report also having some exertional shortness of breath and previous slight burning with urination when she would pee, she states that that has subsided.  Patient denies any chest pain, nausea, vomiting, diarrhea, syncopal episodes, or new open skin sores.       ED Course      Hemodynamaically Stable.    Vitals: Temp. 36.2, HR 75, Resp.  18, /70, Pulse ox 94% RA     Labs: Glucose 95, Na+ 139, K+ 3.9, Bun 26, Creat.  1.28, GFR 39, Ca 9.1, Albumin 4.4, Alk Phos. 69, ALT 12, AST 17, troponin 5, Mg+ 2.34, WBC 6.5,  Hgb.  12.2, Hct.  38.7, UA: Color colorless, specific gravity 1.004, leuk-esterase 250, WBC 11-20,  Medications: 0.9 normal saline 500 mL bolus,  Imaging: interpreted by radiologist.  CT head: Cerebral atrophy and chronic periventricular white matter small vessel  ischemic change.  No evidence of acute intracranial hemorrhage.  Chest x-ray:   Without acute cardiopulmonary process evident.  EKG: Not available for my review.          Past Medical History  Medical History[1]    Surgical History  Surgical History[2]     Social History  She reports that she has quit smoking. Her smoking use included cigarettes. She has never used smokeless tobacco. She reports current alcohol use. She reports that she does not use drugs.    Family History  Family History[3]     Allergies  Amlodipine-benazepril, Hydralazine, and Enalapril-hydrochlorothiazide    Review of Systems     10 point ROS systems completed and is negative except for what is stated in HPI.     Physical Exam  Constitutional:       General: She is not in acute distress.     Appearance: Normal appearance. She is normal weight. She is not toxic-appearing.   HENT:      Head: Normocephalic and atraumatic.      Nose: Nose normal. No rhinorrhea.      Mouth/Throat:      Mouth: Mucous membranes are dry.     Eyes:      General:         Right eye: No discharge.         Left eye: No discharge.      Conjunctiva/sclera: Conjunctivae normal.      Pupils: Pupils are equal, round, and reactive to light.       Cardiovascular:      Rate and Rhythm: Regular rhythm. Bradycardia present.      Pulses: Normal pulses.   Pulmonary:      Effort: Pulmonary effort is normal. No respiratory distress.      Breath sounds: Examination of the right-lower field reveals decreased breath sounds. Examination of the left-lower field reveals decreased breath sounds. Decreased breath sounds present. No wheezing.   Abdominal:      General: Bowel sounds are normal. There is no distension.      Palpations: Abdomen is soft.      Tenderness: There is no abdominal tenderness. There is no guarding.     Musculoskeletal:         General: Normal range of motion.      Cervical back: Normal range of motion and neck supple.      Right lower le+ Edema present.      Left lower  "le+ Edema present.      Comments: Strength and sensation intact x 4.  No drift x 4     Skin:     General: Skin is warm and dry.     Neurological:      General: No focal deficit present.      Mental Status: She is alert and oriented to person, place, and time. Mental status is at baseline.      Cranial Nerves: No facial asymmetry.      Sensory: Sensation is intact.      Motor: No weakness.     Psychiatric:         Attention and Perception: Attention normal.         Mood and Affect: Mood normal.         Speech: Speech is slurred.         Behavior: Behavior normal. Behavior is cooperative.         Cognition and Memory: Cognition normal.      Comments: Patient does have slightly slurred speech.          Last Recorded Vitals  Blood pressure 174/77, pulse 74, temperature 36.4 °C (97.5 °F), temperature source Temporal, resp. rate 17, height 1.6 m (5' 3\"), weight 68.9 kg (152 lb), SpO2 96%.    Relevant Results  Results for orders placed or performed during the hospital encounter of 25 (from the past 24 hours)   CBC   Result Value Ref Range    WBC 6.1 4.4 - 11.3 x10*3/uL    nRBC 0.0 0.0 - 0.0 /100 WBCs    RBC 4.21 4.00 - 5.20 x10*6/uL    Hemoglobin 12.1 12.0 - 16.0 g/dL    Hematocrit 39.1 36.0 - 46.0 %    MCV 93 80 - 100 fL    MCH 28.7 26.0 - 34.0 pg    MCHC 30.9 (L) 32.0 - 36.0 g/dL    RDW 13.2 11.5 - 14.5 %    Platelets 269 150 - 450 x10*3/uL   Coagulation Screen   Result Value Ref Range    Protime 10.4 9.8 - 12.4 seconds    INR 0.9 0.9 - 1.1    aPTT 28 26 - 36 seconds   Basic Metabolic Panel   Result Value Ref Range    Glucose 92 74 - 99 mg/dL    Sodium 143 136 - 145 mmol/L    Potassium 3.7 3.5 - 5.3 mmol/L    Chloride 105 98 - 107 mmol/L    Bicarbonate 28 21 - 32 mmol/L    Anion Gap 14 10 - 20 mmol/L    Urea Nitrogen 19 6 - 23 mg/dL    Creatinine 1.11 (H) 0.50 - 1.05 mg/dL    eGFR 47 (L) >60 mL/min/1.73m*2    Calcium 9.0 8.6 - 10.3 mg/dL   B-Type Natriuretic Peptide   Result Value Ref Range     (H) 0 - " 99 pg/mL   TSH with reflex to Free T4 if abnormal   Result Value Ref Range    Thyroid Stimulating Hormone 1.49 0.44 - 3.98 mIU/L   Troponin I, High Sensitivity, Initial   Result Value Ref Range    Troponin I, High Sensitivity 10 0 - 13 ng/L   Troponin, High Sensitivity, 1 Hour   Result Value Ref Range    Troponin I, High Sensitivity 9 0 - 13 ng/L   POCT GLUCOSE   Result Value Ref Range    POCT Glucose 103 (H) 74 - 99 mg/dL   POCT GLUCOSE   Result Value Ref Range    POCT Glucose 108 (H) 74 - 99 mg/dL   Transthoracic echo (TTE) complete   Result Value Ref Range    AV pk stacie 1.80 m/s    AV mn grad 7 mmHg    LVOT diam 1.50 cm    MV E/A ratio 0.76     Tricuspid annular plane systolic excursion 2.1 cm    LV EF 68 %    RV free wall pk S' 15.00 cm/s    LVIDd 4.21 cm    RVSP 32 mmHg    Aortic Valve Area by Continuity of VTI 1.38 cm2    Aortic Valve Area by Continuity of Peak Velocity 1.35 cm2    AV pk grad 13 mmHg    LV A4C EF 67.3    POCT GLUCOSE   Result Value Ref Range    POCT Glucose 107 (H) 74 - 99 mg/dL   POCT GLUCOSE   Result Value Ref Range    POCT Glucose 142 (H) 74 - 99 mg/dL     Transthoracic echo (TTE) complete  Result Date: 7/27/2025   [facility], [address] [place] [address]           Tel [phone] and Fax [phone] TRANSTHORACIC ECHOCARDIOGRAM REPORT  Patient Name:       LAURA Montoya Physician:    [id number] Smith Swift MD Study Date:         7/27/2025           Ordering Provider:    [id number] LAURA TUTTLE MRN/PID:            [id number]            Fellow: Accession#:         [id number]        Nurse: Date of Birth/Age:  12/5/1932 / 89      Sonographer:          Zaynab ellison RDCS Gender assigned at  F                   Additional Staff: Birth: Height:             160.00 cm           Admit Date:            7/26/2025 Weight:             69.00 kg            Admission Status:     Inpatient -                                                               Priority                                                               discharge BSA / BMI:          1.72 m2 / 26.95     Encounter#:           [id number]                     kg/m2 Blood Pressure:     168/79 mmHg         Department Location:  [facility] [place] Study Type:    TRANSTHORACIC ECHO (TTE) COMPLETE Diagnosis/ICD: Weakness-R53.1 Indication:    dizziness CPT Code:      Echo Complete w Full Doppler-50042 Patient History: Pertinent History: Hyperlipidemia and HTN. Study Detail: The following Echo studies were performed: 2D, M-Mode, Doppler and               color flow. Technically challenging study due to poor acoustic               windows.  PHYSICIAN INTERPRETATION: Left Ventricle: Left ventricular ejection fraction is normal by visual estimate at 65-70%. There are no regional left ventricular wall motion abnormalities. The left ventricular cavity size is normal. There is mildly increased septal and normal posterior left ventricular wall thickness. Spectral Doppler shows a Grade I (impaired relaxation pattern) of left ventricular diastolic filling with normal left atrial filling pressure. Left Atrium: The left atrium is mildly dilated. Right Ventricle: The right ventricle is normal in size. There is normal right ventricular global systolic function. Right Atrium: The right atrium is normal in size. Aortic Valve: The aortic valve was not well visualized. The aortic valve area by VTI is 1.38 cmï¿½ with a peak velocity of 1.80 m/s. The peak and mean gradients are 13 mmHg and 7 mmHg, respectively with a dimensionless index of 0.78. There is evidence of mildly elevated transaortic gradients consistent with sclerosis of the aortic valve. There is no evidence of aortic valve regurgitation. Mitral Valve: The mitral valve is normal in structure. There is no evidence of  mitral valve regurgitation. The E Vmax is 0.91 m/s. Tricuspid Valve: The tricuspid valve is structurally normal. There is mild tricuspid regurgitation. Pulmonic Valve: The pulmonic valve is structurally normal. There is physiologic pulmonic valve regurgitation. Pericardium: There is no pericardial effusion noted. Aorta: The aortic root is normal.  CONCLUSIONS:  1. Left ventricular ejection fraction is normal by visual estimate at 65-70%.  2. Spectral Doppler shows a Grade I (impaired relaxation pattern) of left ventricular diastolic filling with normal left atrial filling pressure.  3. The left atrium is mildly dilated.  4. Aortic valve sclerosis. The peak and mean gradients are 13 mmHg and 7 mmHg respectively. QUANTITATIVE DATA SUMMARY:  2D MEASUREMENTS:          Normal Ranges: IVSd:            1.12 cm  (0.6-1.1cm) LVPWd:           0.69 cm  (0.6-1.1cm) LVIDd:           4.21 cm  (3.9-5.9cm) LVIDs:           2.37 cm LV Mass Index:   70 g/m2 LVEDV Index:     34 ml/m2 LV % FS          43.7 %  AORTA MEASUREMENTS:         Normal Ranges: Asc Ao, d:          3.20 cm (2.1-3.4cm)  LV SYSTOLIC FUNCTION:                      Normal Ranges: EF-A4C View:    67 % (>=55%) EF-A2C View:    70 % EF-Biplane:     70 % EF-Visual:      68 % LV EF Reported: 68 %  LV DIASTOLIC FUNCTION:           Normal Ranges: MV Peak E:             0.91 m/s  (0.7-1.2 m/s) MV Peak A:             1.20 m/s  (0.42-0.7 m/s) E/A Ratio:             0.76      (1.0-2.2) MV e'                  0.096 m/s (>8.0) MV lateral e'          0.12 m/s MV medial e'           0.07 m/s E/e' Ratio:            9.47      (<8.0)  MITRAL VALVE:          Normal Ranges: MV DT:        277 msec (150-240msec)  AORTIC VALVE:                      Normal Ranges: AoV Vmax:                1.80 m/s  (<=1.7m/s) AoV Peak P.0 mmHg (<20mmHg) AoV Mean P.0 mmHg  (1.7-11.5mmHg) LVOT Max Earl:            1.38 m/s  (<=1.1m/s) AoV VTI:                 41.50 cm  (18-25cm)  LVOT VTI:                32.40 cm LVOT Diameter:           1.50 cm   (1.8-2.4cm) AoV Area, VTI:           1.38 cm2  (2.5-5.5cm2) AoV Area,Vmax:           1.35 cm2  (2.5-4.5cm2) AoV Dimensionless Index: 0.78  RIGHT VENTRICLE: RV Basal 3.18 cm RV Mid   2.15 cm RV Major 6.1 cm TAPSE:   20.8 mm RV s'    0.15 m/s  TRICUSPID VALVE/RVSP:          Normal Ranges: Peak TR Velocity:     2.67 m/s  PULMONIC VALVE:          Normal Ranges: PV Max Earl:     1.0 m/s  (0.6-0.9m/s) PV Max P.2 mmHg PV Mean P.0 mmHg PV VTI:         23.80 cm  [id number] Smith Swift MD Electronically signed on 2025 at 8:04:29 PM  ** Final **     CT head wo IV contrast  Result Date: 2025  Interpreted By:  Gurmeet Hua, STUDY: CT HEAD WO IV CONTRAST;  2025 6:20 pm   INDICATION: Signs/Symptoms:dizzy.   COMPARISON: 2025   ACCESSION NUMBER(S): [id number]   ORDERING CLINICIAN: GEORGE CRAFT   TECHNIQUE: Contiguous axial images of the head were obtained without intravenous contrast.   FINDINGS: BRAIN PARENCHYMA:  There is cerebral atrophy and chronic periventricular white matter small vessel ischemic change. The gray white matter differentiation is preserved.  No mass effect or midline shift. Stable mild prominence of CSF space in the left lateral aspect of the posterior fossa which may correspond to an arachnoid cyst.   HEMORRHAGE:  No evidence of acute intracranial hemorrhage. VENTRICLES AND EXTRA-AXIAL SPACES:  The ventricles are within normal limits in size for brain volume.  No evidence of abnormal extraaxial fluid collection. EXTRACRANIAL SOFT TISSUES:  Within normal limits. PARANASAL SINUSES/MASTOIDS:  The visualized paranasal sinuses and mastoid air cells are clear and well pneumatized. CALVARIUM:  No evidence of depressed calvarial fracture.   OTHER FINDINGS:  None       Cerebral atrophy and chronic periventricular white matter small vessel ischemic change.   No evidence of acute intracranial hemorrhage.   MACRO: None    Signed by: Gurmeet Hua 7/26/2025 6:50 PM Dictation workstation:   [id number]    XR chest 1 view  Result Date: 7/26/2025  Interpreted By:  Manny Holguin, STUDY: Chest dated  7/26/2025.   INDICATION: Signs/Symptoms:weak   COMPARISON: Chest dated 05/18/2025.   ACCESSION NUMBER(S): [id number]   ORDERING CLINICIAN: GEORGE CRAFT   TECHNIQUE: One view of the chest.   FINDINGS: The lungs are clear. Density along the right paratracheal stripe region may represent a mediastinal calcified granuloma. No pneumothorax or effusion is evident. The cardiomediastinal silhouette is  not enlarged.The soft tissues are grossly unremarkable.       As above without acute cardiopulmonary process evident.   MACRO: None   Signed by: Manny Holguin 7/26/2025 5:57 PM Dictation workstation:   [id number]      Assessment & Plan  Dehydration    Urinary tract infection without hematuria    HTN (hypertension)    Generalized weakness    Slurred speech    Patient arrived to ED today after having generalized weakness for some time but over the course of the last week she has become unsteady on her feet and has noticed some slurred speech.  Patient initially attributed the generalized weakness with some dizziness to the changes of her blood pressure medications.  Patient became concerned when she started feeling unsteady and having slurred speech.  Initial CT of the head was negative in the ED for stroke.  Will complete stroke workup to rule out any acute findings, neurology consult placed, appreciate recs.  Patient did have a very mild UTI with 11-20 WBC and leuk esterase 250.  Patient did endorse having slight burning with urination approximately a week ago but says it has mostly resolved.  Will go ahead and treat patient for UTI based off her symptoms.  Patient with bilateral lower extremity edema and intermittently hypertensive, cardiology consult placed, appreciate recs.     Patient admitted to Dr. Garcia Alves for further medical  management.     # Unsteady  # Slurred speech  # Hypertension  # Dizziness  -As needed EKG  -As needed oxygen  -MRA of the head and neck MRI of the brain  -Echo ordered  -orthostatic BP lying, standing, standing  -As needed analgesics  -As needed antiemetic  - Neurology consult  -Cardiology consult  -PT/OT/SW   -Cardiac diet   -admitted to observation with telemetry    -q4 vitals    -morning labs CBC, BMP, troponin, BNP, lipid, TSH    # Mild urinary tract infection  # Generalized weakness    -Initiate ceftriaxone  -0.9 normal saline at 50 mL/HR  -Culture pending    Chronic Conditions   # Osteoporosis  # BLE edema  # HLD  # HTN  # Basal cell CA  # Hypothyroidism     Continue home medications as ordered.         DNR with intubation, with ICU, DNR form signed and placed with chart on floor.      #DVT Prophylaxis   Scd's as tolerated   DVT Prophylaxis Heparin      Thorough record review performed of previous labs and notes from prior encounters.            Garcia Alves DO           [1]   Past Medical History:  Diagnosis Date    Abdominal distension (gaseous) 04/09/2018    Abdominal bloating    Abnormal weight loss     Weight loss, non-intentional    Acute upper respiratory infection, unspecified 11/19/2019    URI, acute    Carbuncle, unspecified 09/08/2016    Carbuncle    Disorder of the skin and subcutaneous tissue, unspecified 12/15/2014    Skin lesion of left leg    Menopausal and female climacteric states     Post menopausal syndrome    Other chest pain 11/28/2016    Chest pain, atypical    Overweight     Overweight (BMI 25.0-29.9)    Pain in left ankle and joints of left foot 07/13/2015    Left ankle pain    Personal history of (healed) traumatic fracture     History of fracture of pelvis    Personal history of (healed) traumatic fracture 11/10/2015    History of fracture of pelvis    Personal history of other medical treatment 07/13/2015    History of screening mammography    Unspecified adverse effect of  drug or medicament, initial encounter (CODE) 08/25/2015    Medication side effects    Unspecified asthma, uncomplicated ([facility]) 12/15/2017    Asthmatic bronchitis    Unspecified open wound of scalp, sequela 12/17/2019    Open wound of scalp, unspecified open wound type, sequela   [2]   Past Surgical History:  Procedure Laterality Date    CATARACT EXTRACTION  09/08/2016    Cataract Extraction    HYSTERECTOMY  09/08/2016    Hysterectomy    TOTAL KNEE ARTHROPLASTY  09/08/2016    Knee Replacement    TUBAL LIGATION  10/28/2014    Tubal Ligation   [3]   Family History  Problem Relation Name Age of Onset    Hypertension Mother      Cancer Father

## 2025-07-28 NOTE — PROGRESS NOTES
Cardiology Progress Note      Laura Estevez is a 89 y.o. female on day 0 of admission presenting with Dehydration.      Subjective   Feeling well this morning.  No shortness of breath no chest pain no dizziness.     ROS:  10 systems reviewed other than what is mentioned above.     MEDICATION:    Scheduled medications  Scheduled Medications[1]  Continuous medications  Continuous Medications[2]  PRN medications  PRN Medications[3]     Assessment & Plan  Dehydration    HTN (hypertension)    Urinary tract infection without hematuria    Generalized weakness    Slurred speech    OBJECTIVE:    Visit Vitals  /77 (Patient Position: Lying)   Pulse 64   Temp 36 °C (96.8 °F) (Temporal)   Resp 18      Intake/Output Summary (Last 24 hours) at 7/28/2025 0929  Last data filed at 7/28/2025 0135  Gross per 24 hour   Intake 650 ml   Output --   Net 650 ml      Patient is alert and oriented x3.  HEENT is unremarkable mucous members are moist  Neck no JVP no bruits upstrokes are full no thyromegaly  Lungs are clear bilaterally.  No wheezing crackles or rales  Heart regular rhythm normal S1-S2 there is no S3 soft systolic ejection murmur  Abdomen is soft bowel sounds are positive nontender nondistended no organomegaly no pulsatile masses  Extremities have no edema.  Distal pulses present palpable.  Neuro is grossly nonfocal  Skin has no rashes     Image Results  Transthoracic echo (TTE) [facility], [address] [place] [address]            Tel [phone] and Fax [phone]    TRANSTHORACIC ECHOCARDIOGRAM REPORT       Patient Name:       LAURA ESTEVEZ      Reading Physician:    [id number] Smith Swift MD  Study Date:         7/27/2025           Ordering Provider:    [id number] LAURA TUTTLE  MRN/PID:            [id number]            Fellow:  Accession#:         [id number]         Nurse:  Date of Birth/Age:  12/5/1932 / 89      Sonographer:          Zaynab ellison                                     RDCS  Gender assigned at  F                   Additional Staff:  Birth:  Height:             160.00 cm           Admit Date:           7/26/2025  Weight:             69.00 kg            Admission Status:     Inpatient -                                                                Priority                                                                discharge  BSA / BMI:          1.72 m2 / 26.95     Encounter#:           [id number]                      kg/m2  Blood Pressure:     168/79 mmHg         Department Location:  [facility]    Study Type:    TRANSTHORACIC ECHO (TTE) COMPLETE  Diagnosis/ICD: Weakness-R53.1  Indication:    dizziness  CPT Code:      Echo Complete w Full Doppler-[id number]    Patient History:  Pertinent History: Hyperlipidemia and HTN.    Study Detail: The following Echo studies were performed: 2D, M-Mode, Doppler and                color flow. Technically challenging study due to poor acoustic                windows.       PHYSICIAN INTERPRETATION:  Left Ventricle: Left ventricular ejection fraction is normal by visual estimate at 65-70%. There are no regional left ventricular wall motion abnormalities. The left ventricular cavity size is normal. There is mildly increased septal and normal posterior left ventricular wall thickness. Spectral Doppler shows a Grade I (impaired relaxation pattern) of left ventricular diastolic filling with normal left atrial filling pressure.  Left Atrium: The left atrium is mildly dilated.  Right Ventricle: The right ventricle is normal in size. There is normal right ventricular global systolic function.  Right Atrium: The right atrium is normal in size.  Aortic Valve: The aortic valve was not well visualized. The aortic valve area by VTI is 1.38 cmï¿½ with a peak velocity of 1.80 m/s. The peak and mean gradients are  13 mmHg and 7 mmHg, respectively with a dimensionless index of 0.78. There is evidence of mildly elevated transaortic gradients consistent with sclerosis of the aortic valve. There is no evidence of aortic valve regurgitation.  Mitral Valve: The mitral valve is normal in structure. There is no evidence of mitral valve regurgitation. The E Vmax is 0.91 m/s.  Tricuspid Valve: The tricuspid valve is structurally normal. There is mild tricuspid regurgitation.  Pulmonic Valve: The pulmonic valve is structurally normal. There is physiologic pulmonic valve regurgitation.  Pericardium: There is no pericardial effusion noted.  Aorta: The aortic root is normal.       CONCLUSIONS:   1. Left ventricular ejection fraction is normal by visual estimate at 65-70%.   2. Spectral Doppler shows a Grade I (impaired relaxation pattern) of left ventricular diastolic filling with normal left atrial filling pressure.   3. The left atrium is mildly dilated.   4. Aortic valve sclerosis. The peak and mean gradients are 13 mmHg and 7 mmHg respectively.    QUANTITATIVE DATA SUMMARY:     2D MEASUREMENTS:          Normal Ranges:  IVSd:            1.12 cm  (0.6-1.1cm)  LVPWd:           0.69 cm  (0.6-1.1cm)  LVIDd:           4.21 cm  (3.9-5.9cm)  LVIDs:           2.37 cm  LV Mass Index:   70 g/m2  LVEDV Index:     34 ml/m2  LV % FS          43.7 %       AORTA MEASUREMENTS:         Normal Ranges:  Asc Ao, d:          3.20 cm (2.1-3.4cm)       LV SYSTOLIC FUNCTION:                       Normal Ranges:  EF-A4C View:    67 % (>=55%)  EF-A2C View:    70 %  EF-Biplane:     70 %  EF-Visual:      68 %  LV EF Reported: 68 %       LV DIASTOLIC FUNCTION:           Normal Ranges:  MV Peak E:             0.91 m/s  (0.7-1.2 m/s)  MV Peak A:             1.20 m/s  (0.42-0.7 m/s)  E/A Ratio:             0.76      (1.0-2.2)  MV e'                  0.096 m/s (>8.0)  MV lateral e'          0.12 m/s  MV medial e'           0.07 m/s  E/e' Ratio:            9.47       (<8.0)       MITRAL VALVE:          Normal Ranges:  MV DT:        277 msec (150-240msec)       AORTIC VALVE:                      Normal Ranges:  AoV Vmax:                1.80 m/s  (<=1.7m/s)  AoV Peak P.0 mmHg (<20mmHg)  AoV Mean P.0 mmHg  (1.7-11.5mmHg)  LVOT Max Earl:            1.38 m/s  (<=1.1m/s)  AoV VTI:                 41.50 cm  (18-25cm)  LVOT VTI:                32.40 cm  LVOT Diameter:           1.50 cm   (1.8-2.4cm)  AoV Area, VTI:           1.38 cm2  (2.5-5.5cm2)  AoV Area,Vmax:           1.35 cm2  (2.5-4.5cm2)  AoV Dimensionless Index: 0.78       RIGHT VENTRICLE:  RV Basal 3.18 cm  RV Mid   2.15 cm  RV Major 6.1 cm  TAPSE:   20.8 mm  RV s'    0.15 m/s       TRICUSPID VALVE/RVSP:          Normal Ranges:  Peak TR Velocity:     2.67 m/s       PULMONIC VALVE:          Normal Ranges:  PV Max Earl:     1.0 m/s  (0.6-0.9m/s)  PV Max P.2 mmHg  PV Mean P.0 mmHg  PV VTI:         23.80 cm       [id number] Smith Swift MD  Electronically signed on 2025 at 8:04:29 PM       ** Final **    Relevant Results:  RESULTS:    Lab Results   Component Value Date    WBC 6.1 2025    HGB 12.1 2025    HCT 39.1 2025    MCV 93 2025     2025      Lab Results   Component Value Date    CREATININE 1.11 (H) 2025    BUN 19 2025     2025    K 3.7 2025     2025    CO2 28 2025      Results from last 7 days   Lab Units 25  0556 25  1748   PROTEIN TOTAL g/dL  --  7.6   BILIRUBIN TOTAL mg/dL  --  0.7   ALK PHOS U/L  --  69   ALT U/L  --  12   AST U/L  --  17   GLUCOSE mg/dL 92 95   TSH mIU/L 1.49  --    BNP pg/mL 127*  --        Assessment/Plan   2025  1.  Hypertension.  Will take in a permissive approach but her blood pressures remain high.  She does get symptomatic dizziness with a lot of medications.  She is on amlodipine.  I will start her on chlorthalidone 25 mg to see if this helps.  She has  not tolerated ACE inhibitor's in the past nor hydralazine.  2.  Unsteady gait.  Being evaluated.  3.  Weakness.  Etiology unclear.  Echo reviewed.  LV function normal.  Aortic sclerosis present.    From a cardiac standpoint she is okay for discharge.  She can follow-up with Dr. Alves as an outpatient for blood pressure management.  I will sign off at this time      Smith Swift MD           [1] amLODIPine, 5 mg, oral, Daily  aspirin, 81 mg, oral, Daily  atorvastatin, 80 mg, oral, Nightly  cefTRIAXone, 1 g, intravenous, q24h  cholecalciferol, 50 mcg, oral, Daily  cyanocobalamin, 2,500 mcg, oral, Daily  heparin (porcine), 5,000 Units, subcutaneous, q8h  levothyroxine, 50 mcg, oral, Daily  perflutren lipid microspheres, 2 mL of dilution, intravenous, Once in imaging  potassium chloride CR, 10 mEq, oral, Every other day  pravastatin, 20 mg, oral, Daily  torsemide, 10 mg, oral, Every other day  [2]    [3] PRN medications: acetaminophen, labetaloL, melatonin, ondansetron, oxygen, oxygen, polyethylene glycol

## 2025-07-28 NOTE — CARE PLAN
The patient's goals for the shift include comfort and safety    The clinical goals for the shift include comfort and safety    Over the shift, the patient did make progress toward the following goals.

## 2025-07-28 NOTE — NURSING NOTE
07/28/25 0955 Patient Navigator   I introduced myself to the patient and explained my role. Pt states she lives alone and has the help of her children as needed. She is active around her house and I informed her of the recommendations of the AHA to exercise 150 minutes a week. I reviewed the handouts listed below, the stroke education tab, the following lab values, and what they indicate: cholesterol, HDL, LDL, triglycerides, and A1C. . I gave the patient my business card & instructed her to call me as needed. She appreciated my visit and verbalized understanding of the above note. I informed the patient's RN of my visit.    Handouts:   Stroke booklet  Lifestyle changes to prevent a stroke- American Stroke Association  How do I follow a healthy diet pattern? American Heart Association  How can I improve my cholesterol? [place] [employer]  What can I eat? American Diabetes Association    Jami ORTEGA, RN  Patient Navigator  Stroke Educator  Diabetes Care &

## 2025-07-29 ENCOUNTER — HOME HEALTH ADMISSION (OUTPATIENT)
Dept: HOME HEALTH SERVICES | Facility: HOME HEALTH | Age: OVER 89
End: 2025-07-29
Payer: MEDICARE

## 2025-07-29 ENCOUNTER — DOCUMENTATION (OUTPATIENT)
Dept: HOME HEALTH SERVICES | Facility: HOME HEALTH | Age: OVER 89
End: 2025-07-29
Payer: MEDICARE

## 2025-07-29 NOTE — HH CARE COORDINATION
Home Care received a Referral for Nursing, Physical Therapy, Occupational Therapy, and Home Health Aide. We have processed the referral for a Start of Care within 48 hours of discharge on 7.29.25.     If you have any questions or concerns, please feel free to contact us at [phone]. Follow the prompts, enter your five digit zip code, and you will be directed to your care team on [facility] 3.

## 2025-07-30 LAB
ATRIAL RATE: 71 BPM
P AXIS: 52 DEGREES
PR INTERVAL: 220 MS
Q ONSET: 251 MS
QRS COUNT: 11 BEATS
QRS DURATION: 102 MS
QT INTERVAL: 451 MS
QTC CALCULATION(BAZETT): 494 MS
QTC FREDERICIA: 479 MS
R AXIS: -24 DEGREES
T AXIS: 57 DEGREES
T OFFSET: 476 MS
VENTRICULAR RATE: 72 BPM

## 2025-07-31 ENCOUNTER — HOME CARE VISIT (OUTPATIENT)
Dept: HOME HEALTH SERVICES | Facility: HOME HEALTH | Age: OVER 89
End: 2025-07-31
Payer: MEDICARE

## 2025-07-31 VITALS
OXYGEN SATURATION: 96 % | DIASTOLIC BLOOD PRESSURE: 60 MMHG | RESPIRATION RATE: 18 BRPM | TEMPERATURE: 98.5 F | HEART RATE: 76 BPM | SYSTOLIC BLOOD PRESSURE: 104 MMHG

## 2025-07-31 PROCEDURE — G0152 HHCP-SERV OF OT,EA 15 MIN: HCPCS | Mod: HHH

## 2025-07-31 PROCEDURE — G0299 HHS/HOSPICE OF RN EA 15 MIN: HCPCS | Mod: HHH

## 2025-07-31 ASSESSMENT — ENCOUNTER SYMPTOMS
HIGHEST PAIN SEVERITY IN PAST 24 HOURS: 1/10
DENIES PAIN: 1
SHORTNESS OF BREATH: 1
APPETITE LEVEL: FAIR
AGGRESSION WITHIN DEFINED LIMITS: 1
PERSON REPORTING PAIN: PATIENT
LOWEST PAIN SEVERITY IN PAST 24 HOURS: 0/10
DYSPNEA ACTIVITY LEVEL: AFTER AMBULATING MORE THAN 20 FT
DENIES PAIN: 1
PERSON REPORTING PAIN: PATIENT
CHANGE IN APPETITE: DECREASED
SLEEP QUALITY: ADEQUATE
ANGER WITHIN DEFINED LIMITS: 1
LOWER EXTREMITY EDEMA: 1
MUSCLE WEAKNESS: 1

## 2025-07-31 ASSESSMENT — ACTIVITIES OF DAILY LIVING (ADL)
DRESSING_UB_CURRENT_FUNCTION: INDEPENDENT
BATHING ASSESSED: 1
OASIS_M1830: 03
DRESSING_LB_CURRENT_FUNCTION: MINIMUM ASSIST
CURRENT_FUNCTION: SUPERVISION
ENTERING_EXITING_HOME: DEPENDENT
GROOMING ASSESSED: 1
LAUNDRY ASSESSED: 1
FEEDING: INDEPENDENT
LAUNDRY: DEPENDENT
GROOMING_CURRENT_FUNCTION: INDEPENDENT
FEEDING ASSESSED: 1
PHYSICAL TRANSFERS ASSESSED: 1
BATHING_CURRENT_FUNCTION: SUPERVISION
TOILETING: 1
TOILETING: INDEPENDENT
PREPARING MEALS: NEEDS ASSISTANCE

## 2025-07-31 ASSESSMENT — LIFESTYLE VARIABLES: SMOKING_STATUS: 0

## 2025-07-31 NOTE — CASE COMMUNICATION
----- Message -----  From: Beth Kingsley RN  Sent: 7/31/2025   2:03 PM EDT  To: Zeferino Williamson, OT; Hyacinth Simon, PT; C*      good afternoon, i met w pt for SOC and need to clarify some questions  - was the slurred speech and balance issues r/t bp meds? we didnt see anything that confirmed stroke at time of hospital stay, but pt was sent home with stroke education literature. dc paperwork also states to continue torsemide and pot assium - but pt states she was told to STOP due to new BP med. she has not been taking them since being home. is this correct? thank you.   - pt was found to have UTI during hospital stay. pt doesnt believe she was treated w abx during her stay. she was also not dced w any abx- was she found to have uti? should she be on abx? she does have cranberry supplement, i encouraged her to take as directed.   thank you!

## 2025-07-31 NOTE — DISCHARGE SUMMARY
Discharge Diagnosis  Dehydration           Issues Requiring Follow-Up  HTN    Discharge Meds     Medication List      START taking these medications     chlorthalidone 25 mg tablet; Commonly known as: [place]; Take 1 tablet   (25 mg) by mouth once daily.     CHANGE how you take these medications     levothyroxine 50 mcg tablet; Commonly known as: Synthroid, Levoxyl; Take   1 tablet (50 mcg) by mouth once daily.; What changed: Another medication   with the same name was removed. Continue taking this medication, and   follow the directions you see here.     CONTINUE taking these medications     amLODIPine 5 mg tablet; Commonly known as: Norvasc; Take 1 tablet by   mouth once daily   aspirin 81 mg EC tablet   cholecalciferol 50 mcg (2,000 units) tablet; Commonly known as: Vitamin   D-3   cyanocobalamin (vitamin B-12) 3,000 mcg capsule   potassium chloride CR 10 mEq ER tablet; Commonly known as: Klor-Con;   TAKE 1 TABLET BY MOUTH ONCE DAILY AS DIRECTED   pravastatin 20 mg tablet; Commonly known as: Pravachol; Take 1 tablet   (20 mg) by mouth once daily. as directed   PreserVision AREDS-2 250-90-40-1 mg capsule; Generic drug: vit   C,D-Sx-ltdtp-lutein-zeaxan   torsemide 10 mg tablet; Commonly known as: Demadex; Take 1 tablet (10   mg) by mouth once daily.     STOP taking these medications     ofloxacin 0.3 % otic solution; Commonly known as: Floxin       Test Results Pending At Discharge  Pending Labs       No current pending labs.            Hospital Course   Patient arrived to ED today after having generalized weakness for some time but over the course of the last week she has become unsteady on her feet and has noticed some slurred speech.  Patient initially attributed the generalized weakness with some dizziness to the changes of her blood pressure medications.  Patient became concerned when she started feeling unsteady and having slurred speech.  Initial CT of the head was negative in the ED for stroke.  Will  complete stroke workup to rule out any acute findings, neurology consult placed, appreciate recs.  Patient did have a very mild UTI with 11-20 WBC and leuk esterase 250.  Patient did endorse having slight burning with urination approximately a week ago but says it has mostly resolved.  Will go ahead and treat patient for UTI based off her symptoms.  Patient with bilateral lower extremity edema and intermittently hypertensive, cardiology consult placed, appreciate recs.     Patient admitted to Dr. Garcia Alves for further medical management.      # Unsteady  # Slurred speech  # Hypertension  # Dizziness  -As needed EKG  -As needed oxygen  -MRA of the head and neck MRI of the brain  -Echo ordered  -orthostatic BP lying, standing, standing  -As needed analgesics  -As needed antiemetic  - Neurology consult  -Cardiology consult  -PT/OT/SW   -Cardiac diet   -admitted to observation with telemetry    -q4 vitals    -morning labs CBC, BMP, troponin, BNP, lipid, TSH     # Mild urinary tract infection  # Generalized weakness    -Initiate ceftriaxone  -0.9 normal saline at 50 mL/HR  -Culture pending     Chronic Conditions   # Osteoporosis  # BLE edema  # HLD  # HTN  # Basal cell CA  # Hypothyroidism     Continue home medications as ordered.      7/28: Patient started on chlorthalidone per Cardiology recs. Urine culture was negative. She was discharged home with [facility].    Pertinent Physical Exam At Time of Discharge  Physical Exam  Constitutional:       General: She is not in acute distress.     Appearance: Normal appearance. She is normal weight. She is not toxic-appearing.   HENT:      Head: Normocephalic and atraumatic.      Nose: Nose normal. No rhinorrhea.      Mouth/Throat:      Mouth: Mucous membranes are dry.      Eyes:      General:         Right eye: No discharge.         Left eye: No discharge.      Conjunctiva/sclera: Conjunctivae normal.      Pupils: Pupils are equal, round, and reactive to light.          Cardiovascular:      Rate and Rhythm: Regular rhythm. Bradycardia present.      Pulses: Normal pulses.   Pulmonary:      Effort: Pulmonary effort is normal. No respiratory distress.      Breath sounds: Examination of the right-lower field reveals decreased breath sounds. Examination of the left-lower field reveals decreased breath sounds. Decreased breath sounds present. No wheezing.   Abdominal:      General: Bowel sounds are normal. There is no distension.      Palpations: Abdomen is soft.      Tenderness: There is no abdominal tenderness. There is no guarding.      Musculoskeletal:         General: Normal range of motion.      Cervical back: Normal range of motion and neck supple.      Right lower le+ Edema present.      Left lower le+ Edema present.      Comments: Strength and sensation intact x 4.  No drift x 4      Skin:     General: Skin is warm and dry.      Neurological:      General: No focal deficit present.      Mental Status: She is alert and oriented to person, place, and time. Mental status is at baseline.      Cranial Nerves: No facial asymmetry.      Sensory: Sensation is intact.      Motor: No weakness.      Psychiatric:         Attention and Perception: Attention normal.         Mood and Affect: Mood normal.         Speech: Speech is slurred.         Behavior: Behavior normal. Behavior is cooperative.         Cognition and Memory: Cognition normal.     Outpatient Follow-Up  Future Appointments   Date Time Provider Department Center   2025 To Be Determined Hyacinth Simon PT [facility] Denny Alves DO

## 2025-07-31 NOTE — Clinical Note
good afternoon, i met w pt for SOC and need to clarify some questions  - was the slurred speech and balance issues r/t bp meds? we didnt see anything that confirmed stroke at time of hospital stay, but pt was sent home with stroke education literature. dc paperwork also states to continue torsemide and potassium - but pt states she was told to STOP due to new BP med. she has not been taking them since being home. is this correct? thank you.   - pt was found to have UTI during hospital stay. pt doesnt believe she was treated w abx during her stay. she was also not dced w any abx- was she found to have uti? should she be on abx? she does have cranberry supplement, i encouraged her to take as directed.   thank you!

## 2025-07-31 NOTE — HOME HEALTH
Patient seen alone for OT evaluation visit. Patient was recently hospitalized with generalized weakness and slurred speech (she said tested for a stroke and that was negative, however there was information on strokes in her DC instructions). She appeared to have some slight difficulty expressing herself, similar to a stutter. I told her we could get a ST order but she declined at this time. She said other people said they couldn't hear her slurred speech. Spoke with SN  Beth Kingsley in the driveway after the visit. Called son Ed later in the day and informed him of events of OT visit and my recommendation of a stairlift to the 2nd floor.     Lives alone in a [place] home with 1 step to enter the home from the garage, and then 2 steps up to the main level. The main level has a half bathroom. 12 steps down to the basement with laundry, 12 steps to the 2nd floor with bedroom and a full bathroom with a walk-in shower. Family is coming over frequently to assist.     Medical history of HTN, osteoperosis, HLD, bilateral LE edema, basal cell CA, hypothyroidism, right hand carpal tunnel, OA.     Patient has a cane, wears an Apple Watch as an emergency button, shower chair (not currently using). suction cup grab bar.     Prior to hospitalization, was independent with ADLs and IADLs, she drove. She used a cane in the community, didn't use a device in the home.     UE AROM and strength WNL. Patient in agreement with OT POC. Plan to see 1w1, 2w3. Share case with MANJULA Leach. OT to address showering/LE dressing, walk-in shower transfer and IADL training-light meal prep. Instructed patient not to perform steps on her own, she was unsteady and fatigued going up/down steps.     Medication Reconciliation:    Demonstrates Adherence: Yes  Issues/Concerns: No  Provider Notified of Issues/Concerns: N/A  Caregiver Notified of Issues/Concerns: N/A  Pill bottles visualized during treatment: No, Explain: SN to do admission  later this AM.

## 2025-07-31 NOTE — HOME HEALTH
"pt was seen for soc. went to ED due to weakness, balance issues, and slurred speech. MRI was done, but pt was told she did not have a stroke. pt was dced w BP medication adjustments. clinical notes also state that pt was found to have UTI - but pt states she was never told or treated. was not dced on any oral abx. denies any burning w urination, does state that she feels some \"pressure\", denies pain. encouraged pt to contact pcp or go to urgent care if symptoms persist, pt agrees. pt does have cranberry supplelement, encouraged her to take as directed on the bottle. reviewed all medications w pt- pt has multiple vitamins that she states she takes when she thinks of it. RN did pull out cranberry medication and placed w daily meds so pt will not forget to take it regularly. pt does handle her own medications. meds and allergies reviewed and updated as needed with pt, no issues found. pt removes medications from bottles everyday, no organizer present. pt states she does not forget to take her daily medications, denies any memory issues. pt does live alone, does have 5 sons nearby. sons have been taking turns spending the night w pt to ensure safety. pt is still having balance issues darshan w stair use. OT encouraged pt to NOT use the stairs alone. bedroom and shower are upstairs. pt is agreeable to aide services to ensure shower safety during the day when shes alone. pt has not had any falls in the last year, ambulates w cane. pt also has walker. denies sob, lungs are clear. appetite is good, is encouraged to follow low sodium and heart healthy diet. passing bm normally. vitals wnl. no skilled nursing need, eval only. will work w aide, OT and PT."

## 2025-08-01 ENCOUNTER — HOME CARE VISIT (OUTPATIENT)
Dept: HOME HEALTH SERVICES | Facility: HOME HEALTH | Age: OVER 89
End: 2025-08-01
Payer: MEDICARE

## 2025-08-01 VITALS
OXYGEN SATURATION: 98 % | DIASTOLIC BLOOD PRESSURE: 60 MMHG | HEART RATE: 70 BPM | SYSTOLIC BLOOD PRESSURE: 110 MMHG | TEMPERATURE: 98.9 F

## 2025-08-01 PROCEDURE — G0151 HHCP-SERV OF PT,EA 15 MIN: HCPCS | Mod: HHH

## 2025-08-01 ASSESSMENT — ENCOUNTER SYMPTOMS
PERSON REPORTING PAIN: PATIENT
PAIN: 1
PAIN LOCATION - PAIN SEVERITY: 1/10
PAIN LOCATION: COCCYX
HIGHEST PAIN SEVERITY IN PAST 24 HOURS: 5/10

## 2025-08-01 ASSESSMENT — ACTIVITIES OF DAILY LIVING (ADL)
AMBULATION ASSISTANCE ON FLAT SURFACES: 1
AMBULATION_DISTANCE/DURATION_TOLERATED: 100 FT

## 2025-08-02 ENCOUNTER — HOME CARE VISIT (OUTPATIENT)
Dept: HOME HEALTH SERVICES | Facility: HOME HEALTH | Age: OVER 89
End: 2025-08-02
Payer: MEDICARE

## 2025-08-04 ENCOUNTER — HOME CARE VISIT (OUTPATIENT)
Dept: HOME HEALTH SERVICES | Facility: HOME HEALTH | Age: OVER 89
End: 2025-08-04
Payer: MEDICARE

## 2025-08-04 VITALS
TEMPERATURE: 97.5 F | HEART RATE: 74 BPM | DIASTOLIC BLOOD PRESSURE: 78 MMHG | SYSTOLIC BLOOD PRESSURE: 152 MMHG | OXYGEN SATURATION: 95 %

## 2025-08-04 PROCEDURE — G0158 HHC OT ASSISTANT EA 15: HCPCS | Mod: CO,HHH

## 2025-08-04 PROCEDURE — G0156 HHCP-SVS OF AIDE,EA 15 MIN: HCPCS | Mod: HHH

## 2025-08-04 ASSESSMENT — ENCOUNTER SYMPTOMS
PERSON REPORTING PAIN: PATIENT
DENIES PAIN: 1

## 2025-08-04 NOTE — DOCUMENTATION CLARIFICATION NOTE
"    PATIENT:               LAURA ESTEVEZ  ACCT #:                  [id number]  MRN:                       [id number]  :                       1932  ADMIT DATE:       2025 5:30 PM  DISCH DATE:        2025 6:45 PM  RESPONDING PROVIDER #:        [id number]          PROVIDER RESPONSE TEXT:    Weakness multifactorial due to UTI, dehydration and orthostatic hypotension    CDI QUERY TEXT:    Clarification    Instruction:    Based on your assessment of the patient and the clinical information, please provide the requested documentation by clicking on the appropriate radio button and enter any additional information if prompted.    Question: Please further clarify the most likely etiology of Weakness on admission after final work up    When answering this query, please exercise your independent professional judgment. The fact that a question is being asked, does not imply that any particular answer is desired or expected.    The patient's clinical indicators include:  Clinical Information: 89-year-old female who presents with generalized weakness.     Observation,  Inpatient, Discharge Date     Clinical Indicators:  Vital Signs:  T 36.2, HR 75, Resp 18-29, /70, SPO2 94% on RA   @ 1247 BP Lying 169/78, HR 63. @ 1644 BP Sitting 137/74, HR 74     Cardiology Consult: \"Unsteady gait.  This is less likely to be orthostatic nor cardiac. An echo has been ordered and is pending.  Possibly worsened by urinary tract infection.\"     Cardiology Note: \"Weakness.  Etiology unclear.  Echo reviewed.  LV function normal.  Aortic sclerosis present.\"     D/C Summary:  \"Discharge Diagnosis  Dehydration  Hospital Course  Patient arrived to ED today after having generalized weakness for some time but over the course of the last week she has become unsteady on her feet and has noticed some slurred speech.  Patient initially attributed the generalized weakness with some dizziness to the changes of her " "blood pressure medications.  Patient did have a very mild UTI with 11-20 WBC and leuk esterase 250.  Patient did endorse having slight burning with urination approximately a week ago but says it has mostly resolved.  Will go ahead and treat patient for UTI based off her symptoms.  Mild urinary tract infection  7/28: Patient started on chlorthalidone per Cardiology recs. Urine culture was negative.\"    Treatment: Medications: NS 500ml IV bolus x1 7/26, IV Rocephin 1g x1 on 7/28 (indications comment: Urinary Tract Infection), NS IV at 50ml/hr, Cardiology Consult, Echocardiogram, Neurology Consult    Risk Factors: History of: basal cell CA, hypothyroidism, hypertension, and hyperlipidemia.  Options provided:  -- Weakness multifactorial due to UTI, dehydration and orthostatic hypotension  -- Weakness due to UTI  -- Weakness due to dehydration  -- Weakness due to orthostatic hypotension  -- Other - I will add my own diagnosis  -- Refer to Clinical Documentation Reviewer    Query created by: Luz Elena Padgett on 8/4/2025 11:41 AM      Electronically signed by:  KHUSHBOO SPENCE DO 8/4/2025 1:41 PM          "

## 2025-08-06 ENCOUNTER — HOME CARE VISIT (OUTPATIENT)
Dept: HOME HEALTH SERVICES | Facility: HOME HEALTH | Age: OVER 89
End: 2025-08-06
Payer: MEDICARE

## 2025-08-06 PROCEDURE — G0151 HHCP-SERV OF PT,EA 15 MIN: HCPCS | Mod: HHH

## 2025-08-06 ASSESSMENT — ENCOUNTER SYMPTOMS
PERSON REPORTING PAIN: PATIENT
DENIES PAIN: 1

## 2025-08-07 ENCOUNTER — HOME CARE VISIT (OUTPATIENT)
Dept: HOME HEALTH SERVICES | Facility: HOME HEALTH | Age: OVER 89
End: 2025-08-07
Payer: MEDICARE

## 2025-08-07 PROCEDURE — G0156 HHCP-SVS OF AIDE,EA 15 MIN: HCPCS | Mod: HHH

## 2025-08-08 ENCOUNTER — HOME CARE VISIT (OUTPATIENT)
Dept: HOME HEALTH SERVICES | Facility: HOME HEALTH | Age: OVER 89
End: 2025-08-08
Payer: MEDICARE

## 2025-08-08 VITALS
DIASTOLIC BLOOD PRESSURE: 62 MMHG | TEMPERATURE: 98 F | OXYGEN SATURATION: 97 % | SYSTOLIC BLOOD PRESSURE: 130 MMHG | HEART RATE: 74 BPM

## 2025-08-08 PROCEDURE — G0151 HHCP-SERV OF PT,EA 15 MIN: HCPCS | Mod: HHH

## 2025-08-08 PROCEDURE — G0158 HHC OT ASSISTANT EA 15: HCPCS | Mod: CO,HHH

## 2025-08-08 ASSESSMENT — ENCOUNTER SYMPTOMS
PAIN: 1
PERSON REPORTING PAIN: PATIENT
PAIN LOCATION: RIGHT HAND
PAIN LOCATION - PAIN SEVERITY: 3/10
PAIN LOCATION - PAIN SEVERITY: 3/10
PAIN LOCATION: LEFT HAND

## 2025-08-09 VITALS — DIASTOLIC BLOOD PRESSURE: 67 MMHG | SYSTOLIC BLOOD PRESSURE: 146 MMHG | HEART RATE: 77 BPM

## 2025-08-09 ASSESSMENT — ENCOUNTER SYMPTOMS
PAIN: 1
HIGHEST PAIN SEVERITY IN PAST 24 HOURS: 3/10
PERSON REPORTING PAIN: PATIENT
PAIN LOCATION: GENERALIZED

## 2025-08-11 ENCOUNTER — HOME CARE VISIT (OUTPATIENT)
Dept: HOME HEALTH SERVICES | Facility: HOME HEALTH | Age: OVER 89
End: 2025-08-11
Payer: MEDICARE

## 2025-08-11 VITALS — DIASTOLIC BLOOD PRESSURE: 74 MMHG | HEART RATE: 64 BPM | SYSTOLIC BLOOD PRESSURE: 124 MMHG

## 2025-08-11 PROCEDURE — G0158 HHC OT ASSISTANT EA 15: HCPCS | Mod: CO,HHH

## 2025-08-11 PROCEDURE — G0156 HHCP-SVS OF AIDE,EA 15 MIN: HCPCS | Mod: HHH

## 2025-08-11 ASSESSMENT — ENCOUNTER SYMPTOMS
PERSON REPORTING PAIN: PATIENT
DENIES PAIN: 1

## 2025-08-13 ENCOUNTER — HOME CARE VISIT (OUTPATIENT)
Dept: HOME HEALTH SERVICES | Facility: HOME HEALTH | Age: OVER 89
End: 2025-08-13
Payer: MEDICARE

## 2025-08-13 VITALS — SYSTOLIC BLOOD PRESSURE: 121 MMHG | DIASTOLIC BLOOD PRESSURE: 68 MMHG | HEART RATE: 72 BPM

## 2025-08-13 PROCEDURE — G0158 HHC OT ASSISTANT EA 15: HCPCS | Mod: CO,HHH

## 2025-08-13 PROCEDURE — G0151 HHCP-SERV OF PT,EA 15 MIN: HCPCS | Mod: HHH

## 2025-08-13 ASSESSMENT — ENCOUNTER SYMPTOMS
DENIES PAIN: 1
PERSON REPORTING PAIN: PATIENT
PERSON REPORTING PAIN: PATIENT
DENIES PAIN: 1

## 2025-08-14 ENCOUNTER — HOME CARE VISIT (OUTPATIENT)
Dept: HOME HEALTH SERVICES | Facility: HOME HEALTH | Age: OVER 89
End: 2025-08-14
Payer: MEDICARE

## 2025-08-14 PROCEDURE — G0156 HHCP-SVS OF AIDE,EA 15 MIN: HCPCS | Mod: HHH

## 2025-08-15 ENCOUNTER — HOME CARE VISIT (OUTPATIENT)
Dept: HOME HEALTH SERVICES | Facility: HOME HEALTH | Age: OVER 89
End: 2025-08-15
Payer: MEDICARE

## 2025-08-15 VITALS
TEMPERATURE: 97.8 F | HEART RATE: 78 BPM | DIASTOLIC BLOOD PRESSURE: 60 MMHG | OXYGEN SATURATION: 99 % | SYSTOLIC BLOOD PRESSURE: 108 MMHG

## 2025-08-15 PROCEDURE — G0151 HHCP-SERV OF PT,EA 15 MIN: HCPCS | Mod: HHH

## 2025-08-15 ASSESSMENT — ENCOUNTER SYMPTOMS
PERSON REPORTING PAIN: PATIENT
DENIES PAIN: 1

## 2025-08-18 ENCOUNTER — HOME CARE VISIT (OUTPATIENT)
Dept: HOME HEALTH SERVICES | Facility: HOME HEALTH | Age: OVER 89
End: 2025-08-18
Payer: MEDICARE

## 2025-08-18 PROCEDURE — G0156 HHCP-SVS OF AIDE,EA 15 MIN: HCPCS | Mod: HHH

## 2025-08-19 ENCOUNTER — HOME CARE VISIT (OUTPATIENT)
Dept: HOME HEALTH SERVICES | Facility: HOME HEALTH | Age: OVER 89
End: 2025-08-19
Payer: MEDICARE

## 2025-08-19 PROCEDURE — G0152 HHCP-SERV OF OT,EA 15 MIN: HCPCS | Mod: HHH

## 2025-08-19 PROCEDURE — G0151 HHCP-SERV OF PT,EA 15 MIN: HCPCS | Mod: HHH

## 2025-08-19 ASSESSMENT — ENCOUNTER SYMPTOMS
DENIES PAIN: 1
PERSON REPORTING PAIN: PATIENT
PERSON REPORTING PAIN: PATIENT
DENIES PAIN: 1

## 2025-08-19 ASSESSMENT — ACTIVITIES OF DAILY LIVING (ADL)
AMBULATION_DISTANCE/DURATION_TOLERATED: 100 FT
AMBULATION ASSISTANCE ON FLAT SURFACES: 1

## 2025-08-20 ENCOUNTER — HOME CARE VISIT (OUTPATIENT)
Dept: HOME HEALTH SERVICES | Facility: HOME HEALTH | Age: OVER 89
End: 2025-08-20
Payer: MEDICARE

## 2025-08-20 PROCEDURE — G0156 HHCP-SVS OF AIDE,EA 15 MIN: HCPCS | Mod: HHH

## 2025-08-21 ENCOUNTER — HOME CARE VISIT (OUTPATIENT)
Dept: HOME HEALTH SERVICES | Facility: HOME HEALTH | Age: OVER 89
End: 2025-08-21
Payer: MEDICARE

## 2025-08-21 PROCEDURE — G0151 HHCP-SERV OF PT,EA 15 MIN: HCPCS | Mod: HHH

## 2025-08-21 ASSESSMENT — ENCOUNTER SYMPTOMS
PERSON REPORTING PAIN: PATIENT
DENIES PAIN: 1

## 2025-08-27 ENCOUNTER — HOME CARE VISIT (OUTPATIENT)
Dept: HOME HEALTH SERVICES | Facility: HOME HEALTH | Age: OVER 89
End: 2025-08-27
Payer: MEDICARE

## 2025-08-27 PROCEDURE — G0156 HHCP-SVS OF AIDE,EA 15 MIN: HCPCS | Mod: HHH

## 2025-08-27 PROCEDURE — G0151 HHCP-SERV OF PT,EA 15 MIN: HCPCS | Mod: HHH

## 2025-08-28 ENCOUNTER — HOME CARE VISIT (OUTPATIENT)
Dept: HOME HEALTH SERVICES | Facility: HOME HEALTH | Age: OVER 89
End: 2025-08-28
Payer: MEDICARE

## 2025-08-29 ENCOUNTER — HOME CARE VISIT (OUTPATIENT)
Dept: HOME HEALTH SERVICES | Facility: HOME HEALTH | Age: OVER 89
End: 2025-08-29
Payer: MEDICARE

## 2025-08-29 ENCOUNTER — TELEPHONE (OUTPATIENT)
Dept: PRIMARY CARE | Facility: CLINIC | Age: OVER 89
End: 2025-08-29

## 2025-08-29 PROCEDURE — G0156 HHCP-SVS OF AIDE,EA 15 MIN: HCPCS | Mod: HHH

## 2025-09-02 ENCOUNTER — HOME CARE VISIT (OUTPATIENT)
Dept: HOME HEALTH SERVICES | Facility: HOME HEALTH | Age: OVER 89
End: 2025-09-02
Payer: MEDICARE

## 2025-09-02 PROCEDURE — G0151 HHCP-SERV OF PT,EA 15 MIN: HCPCS | Mod: HHH

## 2025-09-02 PROCEDURE — G0156 HHCP-SVS OF AIDE,EA 15 MIN: HCPCS | Mod: HHH

## 2025-09-02 ASSESSMENT — ENCOUNTER SYMPTOMS
PERSON REPORTING PAIN: PATIENT
DENIES PAIN: 1

## 2025-09-03 ENCOUNTER — HOME CARE VISIT (OUTPATIENT)
Dept: HOME HEALTH SERVICES | Facility: HOME HEALTH | Age: OVER 89
End: 2025-09-03
Payer: MEDICARE

## 2025-10-14 ENCOUNTER — APPOINTMENT (OUTPATIENT)
Dept: PRIMARY CARE | Facility: CLINIC | Age: OVER 89
End: 2025-10-14
Payer: MEDICARE